# Patient Record
Sex: MALE | Race: WHITE | NOT HISPANIC OR LATINO | Employment: FULL TIME | URBAN - METROPOLITAN AREA
[De-identification: names, ages, dates, MRNs, and addresses within clinical notes are randomized per-mention and may not be internally consistent; named-entity substitution may affect disease eponyms.]

---

## 2017-02-13 ENCOUNTER — GENERIC CONVERSION - ENCOUNTER (OUTPATIENT)
Dept: OTHER | Facility: OTHER | Age: 52
End: 2017-02-13

## 2017-04-07 ENCOUNTER — GENERIC CONVERSION - ENCOUNTER (OUTPATIENT)
Dept: OTHER | Facility: OTHER | Age: 52
End: 2017-04-07

## 2017-05-18 ENCOUNTER — ALLSCRIPTS OFFICE VISIT (OUTPATIENT)
Dept: OTHER | Facility: OTHER | Age: 52
End: 2017-05-18

## 2018-01-12 VITALS
TEMPERATURE: 97.8 F | DIASTOLIC BLOOD PRESSURE: 58 MMHG | WEIGHT: 195 LBS | OXYGEN SATURATION: 95 % | SYSTOLIC BLOOD PRESSURE: 120 MMHG | RESPIRATION RATE: 16 BRPM | HEART RATE: 58 BPM

## 2018-09-17 ENCOUNTER — OFFICE VISIT (OUTPATIENT)
Dept: INTERNAL MEDICINE CLINIC | Facility: CLINIC | Age: 53
End: 2018-09-17
Payer: COMMERCIAL

## 2018-09-17 VITALS
HEART RATE: 69 BPM | DIASTOLIC BLOOD PRESSURE: 90 MMHG | HEIGHT: 69 IN | SYSTOLIC BLOOD PRESSURE: 136 MMHG | OXYGEN SATURATION: 99 % | WEIGHT: 199 LBS | BODY MASS INDEX: 29.47 KG/M2

## 2018-09-17 DIAGNOSIS — R63.5 WEIGHT GAIN: ICD-10-CM

## 2018-09-17 DIAGNOSIS — F32.9 MAJOR DEPRESSIVE DISORDER WITH SINGLE EPISODE, REMISSION STATUS UNSPECIFIED: ICD-10-CM

## 2018-09-17 DIAGNOSIS — Z13.29 SCREENING FOR THYROID DISORDER: ICD-10-CM

## 2018-09-17 DIAGNOSIS — K22.719 BARRETT'S ESOPHAGUS WITH DYSPLASIA: ICD-10-CM

## 2018-09-17 DIAGNOSIS — N52.9 ERECTILE DYSFUNCTION, UNSPECIFIED ERECTILE DYSFUNCTION TYPE: ICD-10-CM

## 2018-09-17 DIAGNOSIS — Z13.0 ENCOUNTER FOR SCREENING FOR DISEASES OF THE BLOOD AND BLOOD-FORMING ORGANS AND CERTAIN DISORDERS INVOLVING THE IMMUNE MECHANISM: ICD-10-CM

## 2018-09-17 DIAGNOSIS — Z23 NEED FOR SHINGLES VACCINE: ICD-10-CM

## 2018-09-17 DIAGNOSIS — Z13.1 SCREENING FOR DIABETES MELLITUS: ICD-10-CM

## 2018-09-17 DIAGNOSIS — Z12.5 SCREENING FOR PROSTATE CANCER: ICD-10-CM

## 2018-09-17 DIAGNOSIS — Z13.220 SCREENING FOR HYPERCHOLESTEROLEMIA: ICD-10-CM

## 2018-09-17 DIAGNOSIS — Z00.00 ENCOUNTER FOR WELLNESS EXAMINATION: Primary | ICD-10-CM

## 2018-09-17 DIAGNOSIS — Z11.59 NEED FOR HEPATITIS C SCREENING TEST: ICD-10-CM

## 2018-09-17 PROBLEM — F41.9 ANXIETY: Status: ACTIVE | Noted: 2018-09-17

## 2018-09-17 PROCEDURE — 99396 PREV VISIT EST AGE 40-64: CPT | Performed by: INTERNAL MEDICINE

## 2018-09-17 RX ORDER — SILDENAFIL 50 MG/1
50 TABLET, FILM COATED ORAL DAILY PRN
Qty: 10 TABLET | Refills: 3 | Status: SHIPPED | OUTPATIENT
Start: 2018-09-17 | End: 2019-08-09 | Stop reason: SDUPTHER

## 2018-09-17 RX ORDER — ESCITALOPRAM OXALATE 10 MG/1
TABLET ORAL
Refills: 3 | COMMUNITY
Start: 2018-08-23

## 2018-09-17 RX ORDER — PANTOPRAZOLE SODIUM 40 MG/1
TABLET, DELAYED RELEASE ORAL
COMMUNITY
Start: 2018-03-12 | End: 2021-01-22 | Stop reason: SDUPTHER

## 2018-09-17 NOTE — ASSESSMENT & PLAN NOTE
Last colonoscopy was 04/07/2017, and he is due for repeat 2022  Discussed preventative health, cancer screening, immunizations, and safety issues    Discussed that he should wear seatbelt, not get sunburned, etc    Patient is getting the flu shot at work, prescription for shingles shot given

## 2018-09-17 NOTE — PROGRESS NOTES
Assessment/Plan:    Hutton's esophagus with dysplasia   Follow-up GI for surveillance, and continue proton pump inhibitor as per GI    Major depressive disorder, single episode  Follow up psychiatry, and discussed the possibility of titrating off    Encounter for wellness examination   Last colonoscopy was 04/07/2017, and he is due for repeat 2022  Discussed preventative health, cancer screening, immunizations, and safety issues  Discussed that he should wear seatbelt, not get sunburned, etc    Patient is getting the flu shot at work, prescription for shingles shot given       Diagnoses and all orders for this visit:    Encounter for wellness examination    Hutton's esophagus with dysplasia    Major depressive disorder with single episode, remission status unspecified    Need for shingles vaccine  -     Zoster Vac Recomb Adjuvanted ([address]) 50 MCG SUSR; Inject 50 mcg into a muscle once for 1 dose Repeat one dose in 2 to 6 months    Screening for diabetes mellitus  -     Comprehensive metabolic panel; Future    Screening for thyroid disorder  -     TSH, 3rd generation with Free T4 reflex; Future    Screening for hypercholesterolemia  -     Lipid Panel with Direct LDL reflex; Future    Screening for prostate cancer  -     PSA, Total Screen; Future    Need for hepatitis C screening test    Encounter for screening for diseases of the blood and blood-forming organs and certain disorders involving the immune mechanism  -     CBC and differential; Future  -     Hepatitis C antibody; Future    Erectile dysfunction, unspecified erectile dysfunction type  -     Testosterone; Future  -     sildenafil (VIAGRA) 50 MG tablet; Take 1 tablet (50 mg total) by mouth daily as needed for erectile dysfunction    Weight gain  -     Ambulatory referral to Weight Management;  Future    Other orders  -     Cancel: influenza vaccine, [id number], quadrivalent, 0 5 mL, preservative-free (SYRINGE, SINGLE-DOSE VIAL), for adult and pediatric patients 3 yr+ (AFLURIA, FLUARIX, FLULAVAL, FLUZONE)  -     escitalopram (LEXAPRO) 10 mg tablet; 1 tablet daily  -     oxiconazole (OXISTAT) 1 % lotion; APPLY TO AFFECTED AREA TWICE A DAY AS NEEDED  -     pantoprazole (PROTONIX) 40 mg tablet; TAKE 1 TABLET BY MOUTH EVERY DAY          Subjective:      Patient ID: Dorothea Tao is a 46 y o  male  GERD:  Patient taking proton pump inhibitor daily, no significant GERD, no problems with food getting stuck  Patient does have Hutton's esophagus, and is on proton pump inhibitor for this and follows with GI  Anxiety:  Patient taking the SSRI daily, no significant problems with anxiety  Patient did have an episode of depression, and he has been seen Psychiatry about every 6 months     patient here for wellness exam, he sees a dentist regularly, exercises, eats healthfully, no smoking cigarettes  The following portions of the patient's history were reviewed and updated as appropriate: allergies, current medications, past family history, past medical history, past social history, past surgical history and problem list     Review of Systems   Constitutional: Negative for chills, fatigue and fever  HENT: Negative for congestion, nosebleeds, postnasal drip, sore throat and trouble swallowing  Eyes: Negative for pain  Respiratory: Negative for cough, chest tightness, shortness of breath and wheezing  Cardiovascular: Negative for chest pain, palpitations and leg swelling  Gastrointestinal: Negative for abdominal pain, constipation, diarrhea, nausea and vomiting  Endocrine: Negative for polydipsia and polyuria  Genitourinary: Negative for dysuria, flank pain and hematuria  Musculoskeletal: Negative for arthralgias  Skin: Negative for rash  Neurological: Negative for dizziness, tremors and headaches  Hematological: Does not bruise/bleed easily  Psychiatric/Behavioral: Negative for confusion and dysphoric mood   The patient is not nervous/anxious  Objective:      /90 (BP Location: Left arm, Patient Position: Sitting, Cuff Size: Large)   Pulse 69   Ht 5' 9" (1 753 m)   Wt 90 3 kg (199 lb)   SpO2 99%   BMI 29 39 kg/m²          Physical Exam   Constitutional: He is oriented to person, place, and time  He appears well-developed and well-nourished  No distress  HENT:   Head: Normocephalic and atraumatic  Right Ear: External ear normal    Left Ear: External ear normal    Eyes: Conjunctivae are normal  No scleral icterus  Neck: Normal range of motion  Neck supple  No tracheal deviation present  No thyromegaly present  Cardiovascular: Normal rate, regular rhythm and normal heart sounds  No murmur heard  Pulmonary/Chest: Effort normal and breath sounds normal  No respiratory distress  He has no wheezes  He has no rales  Abdominal: Soft  Bowel sounds are normal  There is no tenderness  There is no rebound and no guarding  Hernia confirmed negative in the right inguinal area and confirmed negative in the left inguinal area  Genitourinary: Rectum normal, prostate normal and penis normal  Right testis shows no mass and no tenderness  Left testis shows no mass and no tenderness  Musculoskeletal: He exhibits no edema  Lymphadenopathy:     He has no cervical adenopathy  Neurological: He is alert and oriented to person, place, and time  Psychiatric: He has a normal mood and affect  His behavior is normal  Judgment and thought content normal    Vitals reviewed

## 2019-08-09 DIAGNOSIS — N52.9 ERECTILE DYSFUNCTION, UNSPECIFIED ERECTILE DYSFUNCTION TYPE: ICD-10-CM

## 2019-08-09 RX ORDER — SILDENAFIL 50 MG/1
50 TABLET, FILM COATED ORAL DAILY PRN
Qty: 10 TABLET | Refills: 3 | Status: SHIPPED | OUTPATIENT
Start: 2019-08-09 | End: 2020-07-10 | Stop reason: SDUPTHER

## 2019-10-10 ENCOUNTER — APPOINTMENT (OUTPATIENT)
Dept: URGENT CARE | Facility: CLINIC | Age: 54
End: 2019-10-10

## 2019-10-10 ENCOUNTER — TRANSCRIBE ORDERS (OUTPATIENT)
Dept: URGENT CARE | Facility: CLINIC | Age: 54
End: 2019-10-10

## 2019-10-10 DIAGNOSIS — Z23 NEED FOR INFLUENZA VACCINATION: Primary | ICD-10-CM

## 2019-10-10 DIAGNOSIS — Z23 NEEDS FLU SHOT: Primary | ICD-10-CM

## 2019-10-31 ENCOUNTER — OFFICE VISIT (OUTPATIENT)
Dept: DERMATOLOGY | Facility: CLINIC | Age: 54
End: 2019-10-31
Payer: COMMERCIAL

## 2019-10-31 VITALS — HEIGHT: 61 IN | BODY MASS INDEX: 36.63 KG/M2 | WEIGHT: 194 LBS | TEMPERATURE: 98.7 F

## 2019-10-31 DIAGNOSIS — L57.8 ACTINIC SKIN DAMAGE: ICD-10-CM

## 2019-10-31 DIAGNOSIS — L82.1 SEBORRHEIC KERATOSIS: ICD-10-CM

## 2019-10-31 DIAGNOSIS — Z85.820 HISTORY OF MELANOMA: Primary | ICD-10-CM

## 2019-10-31 DIAGNOSIS — D22.9 MULTIPLE MELANOCYTIC NEVI: ICD-10-CM

## 2019-10-31 PROCEDURE — 99214 OFFICE O/P EST MOD 30 MIN: CPT | Performed by: DERMATOLOGY

## 2019-10-31 RX ORDER — HYDROQUINONE 40 MG/G
CREAM TOPICAL
Qty: 28.35 G | Refills: 0 | Status: SHIPPED | OUTPATIENT
Start: 2019-10-31 | End: 2020-02-10 | Stop reason: SDUPTHER

## 2019-10-31 NOTE — PROGRESS NOTES
[address] [facility] Note     Patient Name: Segundo Li  Encounter Date: 10/31/2019    Today's Chief Concerns:  [place]  Concern #1:  Full body Exam Hx of MM       Past Medical History:  Have you ever had or currently have any of the following medical conditions or treatments? · HIV/AIDS: No  · Hepatitis B: No  · Hepatitis C: No   · Diabetes: No  · Tuberculosis: No  · Biologic Therapy/Chemotherapy: No  · Organ or Bone Marrow Transplantation: No  · Radiation Treatment: No  · Cancer (If Yes, which types)- No      Have you ever had any of the following skin conditions? · Melanoma? (If Yes, please provide more detail)- YES, back  · Basal Cell Carcinoma: No  · Squamous Cell Carcinoma: No  · Sebaceous Cell Carcinoma: No  · Merkel Cell Carcinoma: No  · Angiosarcoma: No  · Blistering Sunburns: No  · Eczema: No  · Psoriasis: No    Social History:    What is your current Smoking Status? Never smoker     What is/was your primary occupation?      What are your hobbies/past-times? Golf    Family history:  Do any of your "first degree relatives" (parent, brother, sister, or child) have any of the following conditions? · Melanoma? (If Yes, which relatives?) YES, MGM  · Eczema: No  · Asthma: No  · Hay Fever/Seasonal Allergies: No  · Psoriasis: No  · Arthritis: No  · Thyroid Problems: No  · Lupus/Connective Tissue Disease: No  · Diabetes: No  · Stroke: No  · Blood Clots: No  · IBD/Crohn's/Ulcerative Colitis: No  · Vitiligo: No  · Scarring/Keloids: No  · Severe Acne: No  · Pancreatic Cancer: No  · Other known Skin Condition? If Yes, what condition and which relatives? No    Current Medications:    Current Outpatient Medications:     escitalopram (LEXAPRO) 10 mg tablet, 1 tablet daily  , Disp: , Rfl: 3    oxiconazole (OXISTAT) 1 % lotion, APPLY TO AFFECTED AREA TWICE A DAY AS NEEDED, Disp: , Rfl:     pantoprazole (PROTONIX) 40 mg tablet, TAKE 1 TABLET BY MOUTH EVERY DAY, Disp: , Rfl:     sildenafil (VIAGRA) 50 MG tablet, Take 1 tablet (50 mg total) by mouth daily as needed for erectile dysfunction, Disp: 10 tablet, Rfl: 3    Specific Alerts:    Have you been seen by a [facility]atologist in the last 3 years? YES    Are you pregnant or planning to become pregnant? N/A    Are you currently or planning to be nursing or breast feeding? N/A    Allergies   Allergen Reactions    Diflucan [Fluconazole] Other (See Comments)     Mouth sores       May we call your Preferred Phone number to discuss your specific medical information? YES    May we leave a detailed message that includes your specific medical information? YES    Have you traveled outside of the [facility] in the past 3 months? No    Do you currently have a pacemaker or defibrillator? No    Do you have any artificial heart valves, joints, plates, screws, rods, stents, pins, etc?No   - If Yes, were any placed within the last 2 years? Do you require any medications prior to a surgical procedure? No   - If Yes, for which procedure? n/a   - If Yes, what medications to you require? n/a    Are you taking any medications that cause you to bleed more easily ("blood thinners") No    Have you ever experienced a rapid heartbeat with epinephrine? No    Have you ever been treated with "gold" (gold sodium thiomalate) therapy? No    [facility] can help with wrinkles, "laugh lines," facial volume loss, "double chin," "love handles," age spots, and more  Are you interested in learning today about some of the skin enhancement procedures that we offer? (If Yes, please provide more detail) No    Review of Systems:  Have you recently had or currently have any of the following?     · Fever or chills: No  · Night Sweats: No  · Headaches: No  · Weight Gain: No  · Weight Loss: No  · Blurry Vision: No  · Nausea: No  · Vomiting: No  · Diarrhea: No  · Blood in Stool: No  · Abdominal Pain: No  · Itchy Skin: No  · Painful Joints: No  · Swollen Joints: No  · Muscle Pain: No  · Irregular Mole: No  · Sun Burn: No  · Dry Skin: No  · Skin Color Changes: No  · Scar or Keloid: No  · Cold Sores/Fever Blisters: No  · Bacterial Infections/MRSA: No  · Anxiety: No  · Depression: No  · Suicidal or Homicidal Thoughts: No      PHYSICAL EXAM:      Was a chaperone (Derm Clinical Assistant) present for the entirety of the Physical Exam? YES    Did the Dermatology Team specifically ask and  the patient on the importance of a Full Skin Exam to be sure that nothing is missed clinically? YES    Did the patient request or accept a Full Skin Exam?  YES    Did the patient specifically refuse to have the areas "under-the-bra" examined by the Dermatologist? No    Did the patient specifically refuse to have the areas "under-the-underwear" examined by the Dermatologist? No      CONSTITUTIONAL:   There were no vitals filed for this visit      PSYCH: Normal mood and affect  EYES: Normal conjunctiva  ENT: Normal lips and oral mucosa  CARDIOVASCULAR: No edema  RESPIRATORY: Normal respirations  HEME/LYMPH/IMMUNO:  No regional lymphadenopathy except as noted below in ASSESSMENT AND PLAN BY DIAGNOSIS    FULL ORGAN SYSTEM SKIN EXAM (SKIN)  Hair, Scalp, Ears, Face Normal except as noted below in Assessment   Neck, Cervical Chain Nodes Normal except as noted below in Assessment   Right Arm/Hand/Fingers Normal except as noted below in Assessment   Left Arm/Hand/Fingers Normal except as noted below in Assessment   Chest/Breasts/Axillae Viewed areas Normal except as noted below in Assessment   Abdomen, Umbilicus Normal except as noted below in Assessment   Back/Spine Normal except as noted below in Assessment   Groin/Genitalia/Buttocks Viewed areas Normal except as noted below in Assessment   Right Leg, Foot, Toes Normal except as noted below in Assessment   Left Leg, Foot, Toes Normal except as noted below in Assessment        ASSESSMENT AND PLAN BY DIAGNOSIS:    History of Present Condition:     Duration:  How long has this been an issue for you?    o  Years    Location Affected:  Where on the body is this affecting you?    o  Trunk   Quality:  Is there any bleeding, pain, itch, burning/irritation, or redness associated with the skin lesion?    o  Denies   Severity:  Describe any bleeding, pain, itch, burning/irritation, or redness on a scale of 1 to 10 (with 10 being the worst)  o  Denies   Timing:  Does this condition seem to be there pretty constantly or do you notice it more at specific times throughout the day?    o  Denies   Context:  Have you ever noticed that this condition seems to be associated with specific activities you do?    o  Denies   Modifying Factors:    o Anything that seems to make the condition worse?    -  Denies  o What have you tried to do to make the condition better? -  Denies   Associated Signs and Symptoms:  Does this skin lesion seem to be associated with any of the following:  o Denies    1  HISTORY OF MELANOMA/ MIS    Physical Exam:   Anatomic Location Affected:  Back and left leg   Morphological Description of Scar:  Well healed surgical scar   Year Treated: 10 years ago   TNM Classification: Unknown    Suspected Recurrence: no   Regional adenopathy: no    Assessment and Plan:  Based on a thorough discussion of this condition and the management approach to it (including a comprehensive discussion of the known risks, side effects and potential benefits of treatment), the patient (family) agrees to implement the following specific plan:   Start Neutrogena Daily Defense SPF 50 + at least 3 times a day   Yearly Exam     What happens at follow-up? The main purpose of follow-up is to detect recurrences early (metastatic melanoma), but it also offers an opportunity to diagnose a new primary melanoma at the first possible opportunity   A second invasive melanoma occurs in 5-10% of melanoma patients and a new melanoma in situ is diagnosed in more than 20% of melanoma patients  Our practice makes the following recommendations for follow-up for patients with invasive melanoma   At-least "monthly" self-skin examinations    Routine skin checks by a board certified dermatologist   Follow-up intervals are "every 3 months" within 2 years of a new melanoma diagnosis; "every 6 months" between 2-4 years of a new melanoma diagnosis; and "annually" after 4 years of a new melanoma diagnosis   Individual patient's needs should be considered before an appropriate follow-up is offered   Provide education and support to help the patient adjust to their illness    Follow-up appointments should include:   A check of the scar where the primary melanoma was removed   Checking the regional lymph nodes   A general skin examination   A full physical examination at least annually by your primary care physician    In those with more advanced primary disease, follow-up may include:   Blood tests   Imaging: ultrasound, X-ray, CT, MRI and PET scan  Most tests are not worthwhile for patients with stage 1 or 2 melanoma unless there are signs or symptoms of disease recurrence or metastasis  No tests are necessary for healthy patients who have remained well for five years or longer after removal of their melanoma  What is the outlook for patients with melanoma?  Melanoma in situ is cured by excision because it has no potential to spread around the body   The risk of spread and ultimate death from invasive melanoma depends on several factors, but the main one is the Breslow thickness of the melanoma at the time it was surgically removed   Metastases are rare for melanomas < 0 75 mm and the risk for tumours 0 75-1 mm thick is about 5%  The risk steadily increases with thickness so that melanomas > 4 mm have a risk of metastasis of about 40%      Melanoma is a potentially serious type of skin cancer, in which there is uncontrolled growth of melanocytes (pigment cells)  Melanoma is sometimes called malignant melanoma  Normal melanocytes are found in the basal layer of the epidermis (the outer layer of skin)  Melanocytes produce a protein called melanin, which protects skin cells by absorbing ultraviolet (UV) radiation  Melanocytes are found in equal numbers in black and white skin, but melanocytes in black skin produce much more melanin  People with dark brown or black skin are very much less likely to be damaged by UV radiation than those with white skin  2  ACTINIC DAMAGE (Chronic Ultraviolet Radiation Exposure)    Physical Exam:   Anatomic Location Affected:  Trunk neck and extremities    Morphological Description:  Mottled (hyper- and hypo-pigmented), slightly atrophic skin with overlying telangiectasia  Poiklodermattous changes are prominent on neck bilaterally with hyperpigmentation   Pertinent Positives:   Pertinent Negatives: No lymphadenopathy    Assessment and Plan:  Based on a thorough discussion of this condition and the management approach to it (including a comprehensive discussion of the known risks, side effects and potential benefits of treatment), the patient (family) agrees to implement the following specific plan:   Yearly Exam     Hydorquinone 4% BID neck for 4 months only  Reviewed adverse effect and pardoxical pigmentation  Sunscreen use recommended  Photo-aging and actinic damage of skin is common on sites repeatedly exposed to the sun, especially the backs of the hands and the face, most often affecting the ears, nose, cheeks, upper lip, vermilion of the lower lip, temples, forehead and balding scalp  In severely chronically sun-exposed individuals, this condition may also be found on the upper trunk, upper and lower limbs, and dorsum of feet  Photo-aging induces cutaneous changes that vary among individuals, reflecting inherent differences in vulnerability to sun exposure and repair capacity      We discussed further steps to minimize or avoid UV exposure:     Be aware of daily UV index levels  In the [place] [facility], this index is often reported on the [address]   Avoid outdoor activities during the middle of the day   Wear sun-protective clothing (e g , UPF-rated, broad-brimmed hats, long sleeves, and trousers or skirts)   Apply a high sun protection factor (60+) broad-spectrum sunscreen moisturizer at least three times a day to affected areas, year-round  I recommended Neutrogena Daily Defense or CeraVe AM or Aveeno   Do not smoke, and where possible, avoid exposure to pollutants   Get plenty of exercise -- active people appear younger than inactive people   Eat fruit and vegetables daily   Many oral supplements with antioxidant and anti-inflammatory properties have been advocated to mitigate skin aging and to improve skin health  These include carotenoids; polyphenols; chlorophyll; aloe vera; vitamins B, C, and E; red ginseng; squalene; and omega-3 fatty acids  Their role in combatting skin aging is unclear  3  SEBORRHEIC KERATOSIS; NON-INFLAMED    Physical Exam:   Anatomic Location Affected:  Trunk, including left back   Morphological Description:  Flat and raised, waxy, smooth to warty textured, yellow to brownish-grey to dark brown to blackish, discrete, "stuck-on" appearing papules   Pertinent Positives:   Pertinent Negatives: Additional History of Present Condition:  Patient reports new bumps on the skin  Denies itch, burn, pain, bleeding or ulceration  Present constantly; nothing seems to make it worse or better  No prior treatment        Assessment and Plan:  Based on a thorough discussion of this condition and the management approach to it (including a comprehensive discussion of the known risks, side effects and potential benefits of treatment), the patient (family) agrees to implement the following specific plan:   Benign, no treatment attempted     Seborrheic Keratosis  A seborrheic keratosis is a harmless warty spot that appears during adult life as a common sign of skin aging  Seborrheic keratoses can arise on any area of skin, covered or uncovered, with the usual exception of the palms and soles  They do not arise from mucous membranes  Seborrheic keratoses can have highly variable appearance  Seborrheic keratoses are extremely common  It has been estimated that over 90% of adults over the age of 61 years have one or more of them  They occur in males and females of all races, typically beginning to erupt in the 35s or 45s  They are uncommon under the age of 21 years  The precise cause of seborrhoeic keratoses is not known  Seborrhoeic keratoses are considered degenerative in nature  As time goes by, seborrheic keratoses tend to become more numerous  Some people inherit a tendency to develop a very large number of them; some people may have hundreds of them  The name "seborrheic keratosis" is misleading, because these lesions are not limited to a seborrhoeic distribution (scalp, mid-face, chest, upper back), nor are they formed from sebaceous glands, nor are they associated with sebum -- which is greasy  Seborrheic keratosis may also be called "SK," "Seb K," "basal cell papilloma," "senile wart," or "barnacle "      Researchers have noted:   Eruptive seborrhoeic keratoses can follow sunburn or dermatitis   Skin friction may be the reason they appear in body folds   Viral cause (e g , human papillomavirus) seems unlikely   Stable and clonal mutations or activation of FRFR3, PIK3CA, WILL, AKT1 and EGFR genes are found in seborrhoeic keratoses   Seborrhoeic keratosis can arise from solar lentigo   FRFR3 mutations also arise in solar lentigines   These mutations are associated with increased age and location on the head and neck, suggesting a role of ultraviolet radiation in these lesions   Seborrheic keratoses do not harbour tumour suppressor gene mutations   Epidermal growth factor receptor inhibitors, which are used to treat some cancers, often result in an increase in verrucal (warty) keratoses  There is no easy way to remove multiple lesions on a single occasion  Unless a specific lesion is "inflamed" and is causing pain or stinging/burning or is bleeding, most insurance companies do not authorize treatment  4  MELANOCYTIC NEVI ("Moles")    Physical Exam:   Anatomic Location Affected:   Mostly on sun-exposed areas of the trunk   Morphological Description:  Scattered, 1-4mm round to ovoid, symmetrical-appearing, even bordered, skin colored to dark brown macules/papules, mostly in sun-exposed areas    Assessment and Plan:  Based on a thorough discussion of this condition and the management approach to it (including a comprehensive discussion of the known risks, side effects and potential benefits of treatment), the patient (family) agrees to implement the following specific plan:   Yearly Exam      Melanocytic Nevi  Melanocytic nevi ("moles") are tan or brown, raised or flat areas of the skin which have an increased number of melanocytes  Melanocytes are the cells in our body which make pigment and account for skin color  Some moles are present at birth (I e , "congenital nevi"), while others come up later in life (i e , "acquired nevi")  The sun can stimulate the body to make more moles  Sunburns are not the only thing that triggers more moles  Chronic sun exposure can do it too  Clinically distinguishing a healthy mole from melanoma may be difficult, even for experienced dermatologists  The "ABCDE's" of moles have been suggested as a means of helping to alert a person to a suspicious mole and the possible increased risk of melanoma  The suggestions for raising alert are as follows:    Asymmetry: Healthy moles tend to be symmetric, while melanomas are often asymmetric    Asymmetry means if you draw a line through the mole, the two halves do not match in color, size, shape, or surface texture  Asymmetry can be a result of rapid enlargement of a mole, the development of a raised area on a previously flat lesion, scaling, ulceration, bleeding or scabbing within the mole  Any mole that starts to demonstrate "asymmetry" should be examined promptly by a board certified dermatologist      Border: Healthy moles tend to have discrete, even borders  The border of a melanoma often blends into the normal skin and does not sharply delineate the mole from normal skin  Any mole that starts to demonstrate "uneven borders" should be examined promptly by a board certified dermatologist      Color: Healthy moles tend to be one color throughout  Melanomas tend to be made up of different colors ranging from dark black, blue, white, or red  Any mole that demonstrates a color change should be examined promptly by a board certified dermatologist      Diameter: Healthy moles tend to be smaller than 0 6 cm in size; an exception are "congenital nevi" that can be larger  Melanomas tend to grow and can often be greater than 0 6 cm (1/4 of an inch, or the size of a pencil eraser)  This is only a guideline, and many normal moles may be larger than 0 6 cm without being unhealthy  Any mole that starts to change in size (small to bigger or bigger to smaller) should be examined promptly by a board certified dermatologist      Evolving: Healthy moles tend to "stay the same "  Melanomas may often show signs of change or evolution such as a change in size, shape, color, or elevation  Any mole that starts to itch, bleed, crust, burn, hurt, or ulcerate or demonstrate a change or evolution should be examined promptly by a board certified dermatologist       Dysplastic Nevi  Dysplastic moles are moles that fit the ABCDE rules of melanoma but are not identified as melanomas when examined under the microscope  They may indicate an increased risk of melanoma in that person   If there is a family history of melanoma, most experts agree that the person may be at an increased risk for developing a melanoma  Experts still do not agree on what dysplastic moles mean in patients without a personal or family history of melanoma  Dysplastic moles are usually larger than common moles and have different colors within it with irregular borders  The appearance can be very similar to a melanoma  Biopsies of dysplastic moles may show abnormalities which are different from a regular mole  Melanoma  Malignant melanoma is a type of skin cancer that can be deadly if it spreads throughout the body  The incidence of melanoma in the [place] is growing faster than any other cancer  Melanoma usually grows near the surface of the skin for a period of time, and then begins to grow deeper into the skin  Once it grows deeper into the skin, the risk of spread to other organs greatly increases  Therefore, early detection and removal of a malignant melanoma may result in a better chance at a complete cure; removal after the tumor has spread may not be as effective, leading to worse clinical outcomes such as death  The true rate of nevus transformation into a melanoma is unknown  It has been estimated that the lifetime risk for any acquired melanocytic nevus on any 21year-old individual transforming into melanoma by age [de-identified] is 0 03% (1 in 3,164) for men and 0 009% (1 in 10,800) for women  The appearance of a "new mole" remains one of the most reliable methods for identifying a malignant melanoma  Occasionally, melanomas appear as rapidly growing, blue-black, dome-shaped bumps within a previous mole or previous area of normal skin  Other times, melanomas are suspected when a mole suddenly appears or changes  Itching, burning, or pain in a pigmented lesion should increase suspicion, but most patients with early melanoma have no skin discomfort whatsoever    Melanoma can occur anywhere on the skin, including areas that are difficult for self-examination  Many melanomas are first noticed by other family members  Suspicious-looking moles may be removed for microscopic examination  You may be able to prevent death from melanoma by doing two simple things:    1  Try to avoid unnecessary sun exposure and protect your skin when it is exposed to the sun  People who live near the equator, people who have intermittent exposures to large amounts of sun, and people who have had sunburns in childhood or adolescence have an increased risk for melanoma  Sun sense and vigilant sun protection may be keys to helping to prevent melanoma  We recommend wearing UPF-rated sun protective clothing and sunglasses whenever possible and applying a moisturizer-sunscreen combination product (SPF 50+) such as Neutrogena Daily Defense to sun exposed areas of skin at least three times a day  2  Have your moles regularly examined by a board certified dermatologist AND by yourself or a family member/friend at home  We recommend that you have your moles examined at least once a year by a board certified dermatologist   Use your birthday as an annual reminder to have your "Birthday Suit" (I e , your skin) examined; it is a nice birthday gift to yourself to know that your skin is healthy appearing! Additionally, at-home self examinations may be helpful for detecting a possible melanoma  Use the ABCDEs we discussed and check your moles once a month at home            Scribe Attestation    I,:   Denver Bucker, MA am acting as a scribe while in the presence of the attending physician :        I,:   Cassi Horne MD personally performed the services described in this documentation    as scribed in my presence :

## 2020-02-10 DIAGNOSIS — B36.0 TINEA VERSICOLOR: Primary | ICD-10-CM

## 2020-02-10 DIAGNOSIS — L57.8 ACTINIC SKIN DAMAGE: ICD-10-CM

## 2020-02-10 RX ORDER — HYDROQUINONE 40 MG/G
CREAM TOPICAL
Qty: 28.35 G | Refills: 0 | Status: SHIPPED | OUTPATIENT
Start: 2020-02-10 | End: 2020-04-10

## 2020-04-10 DIAGNOSIS — L57.8 ACTINIC SKIN DAMAGE: ICD-10-CM

## 2020-04-10 RX ORDER — HYDROQUINONE 40 MG/G
CREAM TOPICAL
Qty: 28.35 G | Refills: 0 | Status: SHIPPED | OUTPATIENT
Start: 2020-04-10

## 2020-07-08 ENCOUNTER — TELEMEDICINE (OUTPATIENT)
Dept: INTERNAL MEDICINE CLINIC | Facility: CLINIC | Age: 55
End: 2020-07-08
Payer: COMMERCIAL

## 2020-07-08 DIAGNOSIS — Z13.220 SCREENING FOR HYPERCHOLESTEROLEMIA: ICD-10-CM

## 2020-07-08 DIAGNOSIS — Z13.1 SCREENING FOR DIABETES MELLITUS: ICD-10-CM

## 2020-07-08 DIAGNOSIS — Z20.822 EXPOSURE TO 2019 NOVEL CORONAVIRUS: Primary | ICD-10-CM

## 2020-07-08 DIAGNOSIS — Z12.5 SCREENING FOR PROSTATE CANCER: ICD-10-CM

## 2020-07-08 DIAGNOSIS — Z13.29 SCREENING FOR THYROID DISORDER: ICD-10-CM

## 2020-07-08 DIAGNOSIS — E55.9 VITAMIN D DEFICIENCY: ICD-10-CM

## 2020-07-08 DIAGNOSIS — Z13.0 ENCOUNTER FOR SCREENING FOR DISEASES OF THE BLOOD AND BLOOD-FORMING ORGANS AND CERTAIN DISORDERS INVOLVING THE IMMUNE MECHANISM: ICD-10-CM

## 2020-07-08 DIAGNOSIS — Z11.59 NEED FOR HEPATITIS C SCREENING TEST: ICD-10-CM

## 2020-07-08 PROCEDURE — 99213 OFFICE O/P EST LOW 20 MIN: CPT | Performed by: INTERNAL MEDICINE

## 2020-07-08 PROCEDURE — 1036F TOBACCO NON-USER: CPT | Performed by: INTERNAL MEDICINE

## 2020-07-08 NOTE — ASSESSMENT & PLAN NOTE
If you choose to have a COVID-19 antibody test, you should understand some important limitations of this test   Antibody tests detect the body's immune response to infection rather than detecting the virus itself  It can take weeks for antibodies to show up in the blood  For this reason, antibody tests are not used to detect or manage infection  They may be better for tracking infections in the community  Current antibody tests have not undergone the usual strict FDA approval process  The FDA decided to make it easier to have more tests available  The result is that these new tests may not be reliable  Since COVID-19 antibody testing is so new, we do not know how accurate it is  You can have a positive test and have not actually been infected  You also can have a negative test even though you might have been infected  There are other coronaviruses that are known to cause the common cold  Many people may have antibodies to these other viruses that could make the COVID-19 antibody test positive  More importantly, even if you test positive, this does not necessarily mean you are immune to the virus  Even so, your [facility]'s provider understands that you may still want a COVID-19 antibody test and can order this test for you  It is important that you review the results with your provider and decide together how to use them

## 2020-07-08 NOTE — PROGRESS NOTES
Virtual Regular Visit      Assessment/Plan:    Problem List Items Addressed This Visit        Other    Exposure to 2019 novel coronavirus - Primary     If you choose to have a COVID-19 antibody test, you should understand some important limitations of this test   Antibody tests detect the body's immune response to infection rather than detecting the virus itself  It can take weeks for antibodies to show up in the blood  For this reason, antibody tests are not used to detect or manage infection  They may be better for tracking infections in the community  Current antibody tests have not undergone the usual strict FDA approval process  The FDA decided to make it easier to have more tests available  The result is that these new tests may not be reliable  Since COVID-19 antibody testing is so new, we do not know how accurate it is  You can have a positive test and have not actually been infected  You also can have a negative test even though you might have been infected  There are other coronaviruses that are known to cause the common cold  Many people may have antibodies to these other viruses that could make the COVID-19 antibody test positive  More importantly, even if you test positive, this does not necessarily mean you are immune to the virus  Even so, your [facility]'s provider understands that you may still want a COVID-19 antibody test and can order this test for you  It is important that you review the results with your provider and decide together how to use them              Relevant Orders    SARS-CoV2 Antibody, Total (IgG, IgA, IgM) [facility]- Lab Collect      Other Visit Diagnoses     Encounter for screening for diseases of the blood and blood-forming organs and certain disorders involving the immune mechanism        Relevant Orders    CBC and differential    Screening for diabetes mellitus        Relevant Orders    Comprehensive metabolic panel    Screening for hypercholesterolemia        Relevant Orders    Lipid Panel with Direct LDL reflex    Screening for thyroid disorder        Relevant Orders    TSH, 3rd generation with Free T4 reflex    Screening for prostate cancer        Relevant Orders    PSA, Total Screen    Vitamin D deficiency        Relevant Orders    Vitamin D 25 hydroxy    Need for hepatitis C screening test        Relevant Orders    Hepatitis C antibody               Reason for visit is   Chief Complaint   Patient presents with    Virtual Regular Visit        Encounter provider Tk Evans MD    Provider located at [address] [place] [id number]      Recent Visits  No visits were found meeting these conditions  Showing recent visits within past 7 days and meeting all other requirements     Today's Visits  Date Type Provider Dept   07/08/20 Telemedicine Tl Ruth today's visits and meeting all other requirements     Future Appointments  No visits were found meeting these conditions  Showing future appointments within next 150 days and meeting all other requirements        The patient was identified by name and date of birth  Esther Pimentel was informed that this is a telemedicine visit and that the visit is being conducted through [facility] and patient was informed that this is a secure, HIPAA-compliant platform  He agrees to proceed     My office door was closed  No one else was in the room  He acknowledged consent and understanding of privacy and security of the video platform  The patient has agreed to participate and understands they can discontinue the visit at any time  Patient is aware this is a billable service  Subjective  Esther Pimentel is a 47 y o  male   Pt reports having COVID symptoms around February and want testing done  No current symptoms, no fevers, no chills, no anosmia, no cough, no shortness of breath         Past Medical History:   Diagnosis Date    Melanoma ([facility] [address] ) 10 years ago    Back    Melanoma ([facility] [address] ) 10 years ago    Neck       Past Surgical History:   Procedure Laterality Date    BACK SURGERY      TONSILLECTOMY      UVULECTOMY         Current Outpatient Medications   Medication Sig Dispense Refill    escitalopram (LEXAPRO) 10 mg tablet 1 tablet daily  3    hydroquinone 4 % cream APPLY TOPICALLY TO NECK TWICE A DAY 28 35 g 0    oxiconazole (OXISTAT) 1 % lotion To affected area  Twice daily 60 mL 3    pantoprazole (PROTONIX) 40 mg tablet TAKE 1 TABLET BY MOUTH EVERY DAY      sildenafil (VIAGRA) 50 MG tablet Take 1 tablet (50 mg total) by mouth daily as needed for erectile dysfunction (Patient not taking: Reported on 10/31/2019) 10 tablet 3     No current facility-administered medications for this visit  Allergies   Allergen Reactions    Diflucan [Fluconazole] Other (See Comments)     Mouth sores       Review of Systems   Constitutional: Negative for chills, fatigue and fever  HENT: Negative for congestion, nosebleeds, postnasal drip, sore throat and trouble swallowing  Eyes: Negative for pain  Respiratory: Negative for cough, chest tightness, shortness of breath and wheezing  Cardiovascular: Negative for chest pain, palpitations and leg swelling  Gastrointestinal: Negative for abdominal pain, constipation, diarrhea, nausea and vomiting  Endocrine: Negative for polydipsia and polyuria  Genitourinary: Negative for dysuria, flank pain and hematuria  Musculoskeletal: Negative for arthralgias  Skin: Negative for rash  Neurological: Negative for dizziness, tremors and headaches  Hematological: Does not bruise/bleed easily  Psychiatric/Behavioral: Negative for confusion and dysphoric mood  The patient is not nervous/anxious  Video Exam    There were no vitals filed for this visit      Physical Exam     I spent 20 minutes with patient today in which greater than 50% of the time was spent in counseling/coordination of care regarding acute concerns      VIRTUAL VISIT DISCLAIMER    Wing Stover acknowledges that he has consented to an online visit or consultation  He understands that the online visit is based solely on information provided by him, and that, in the absence of a face-to-face physical evaluation by the physician, the diagnosis he receives is both limited and provisional in terms of accuracy and completeness  This is not intended to replace a full medical face-to-face evaluation by the physician  [employer] understands and accepts these terms

## 2020-07-08 NOTE — PATIENT INSTRUCTIONS
Problem List Items Addressed This Visit        Other    Exposure to 2019 novel coronavirus - Primary     If you choose to have a COVID-19 antibody test, you should understand some important limitations of this test   Antibody tests detect the body's immune response to infection rather than detecting the virus itself  It can take weeks for antibodies to show up in the blood  For this reason, antibody tests are not used to detect or manage infection  They may be better for tracking infections in the community  Current antibody tests have not undergone the usual strict FDA approval process  The FDA decided to make it easier to have more tests available  The result is that these new tests may not be reliable  Since COVID-19 antibody testing is so new, we do not know how accurate it is  You can have a positive test and have not actually been infected  You also can have a negative test even though you might have been infected  There are other coronaviruses that are known to cause the common cold  Many people may have antibodies to these other viruses that could make the COVID-19 antibody test positive  More importantly, even if you test positive, this does not necessarily mean you are immune to the virus  Even so, your [facility]'s provider understands that you may still want a COVID-19 antibody test and can order this test for you  It is important that you review the results with your provider and decide together how to use them              Relevant Orders    SARS-CoV2 Antibody, Total (IgG, IgA, IgM) [facility]- Lab Collect      Other Visit Diagnoses     Encounter for screening for diseases of the blood and blood-forming organs and certain disorders involving the immune mechanism        Relevant Orders    CBC and differential    Screening for diabetes mellitus        Relevant Orders    Comprehensive metabolic panel    Screening for hypercholesterolemia        Relevant Orders    Lipid Panel with Direct LDL reflex Screening for thyroid disorder        Relevant Orders    TSH, 3rd generation with Free T4 reflex    Screening for prostate cancer        Relevant Orders    PSA, Total Screen    Vitamin D deficiency        Relevant Orders    Vitamin D 25 hydroxy    Need for hepatitis C screening test        Relevant Orders    Hepatitis C antibody        I recommend getting the Shingrix shot to help prevent Shingles  You can get it a pharmacy, and they can administer it there  It is a two shot series with the second shot needed between 2-6 months after the first shot  I would not recommend getting the shot before an important or fun event in case you were to have a reaction to the shot like a sore arm or flu-like symptoms  I make the same recommendation about any shot, as people can have a reaction to any shot      I also recommend Tdap vaccination

## 2020-07-10 DIAGNOSIS — N52.9 ERECTILE DYSFUNCTION, UNSPECIFIED ERECTILE DYSFUNCTION TYPE: ICD-10-CM

## 2020-07-10 RX ORDER — SILDENAFIL 50 MG/1
50 TABLET, FILM COATED ORAL DAILY PRN
Qty: 10 TABLET | Refills: 3 | Status: SHIPPED | OUTPATIENT
Start: 2020-07-10 | End: 2022-05-03 | Stop reason: SDUPTHER

## 2020-07-13 ENCOUNTER — TELEPHONE (OUTPATIENT)
Dept: INTERNAL MEDICINE CLINIC | Facility: CLINIC | Age: 55
End: 2020-07-13

## 2020-07-13 DIAGNOSIS — Z20.828 EXPOSURE TO SARS-ASSOCIATED CORONAVIRUS: Primary | ICD-10-CM

## 2020-07-13 NOTE — TELEPHONE ENCOUNTER
The patient's son was exposed to someone who has covid  The son saw his friend on Tuesday  The friend had a covid test done on thursday with came back positive  Both the friend and the patient's son are asymptomatic  The son and the patient live together  The patient would like to know if he should be tested  The patient's son has a covid visit tomorrow  Please advise  Thank you

## 2020-07-14 DIAGNOSIS — Z20.828 EXPOSURE TO SARS-ASSOCIATED CORONAVIRUS: ICD-10-CM

## 2020-07-14 PROCEDURE — U0003 INFECTIOUS AGENT DETECTION BY NUCLEIC ACID (DNA OR RNA); SEVERE ACUTE RESPIRATORY SYNDROME CORONAVIRUS 2 (SARS-COV-2) (CORONAVIRUS DISEASE [COVID-19]), AMPLIFIED PROBE TECHNIQUE, MAKING USE OF HIGH THROUGHPUT TECHNOLOGIES AS DESCRIBED BY CMS-2020-01-R: HCPCS | Performed by: OBSTETRICS & GYNECOLOGY

## 2020-07-15 LAB
INPATIENT: NORMAL
SARS-COV-2 RNA SPEC QL NAA+PROBE: NOT DETECTED

## 2020-08-14 ENCOUNTER — APPOINTMENT (OUTPATIENT)
Dept: LAB | Facility: MEDICAL CENTER | Age: 55
End: 2020-08-14
Payer: COMMERCIAL

## 2020-08-14 DIAGNOSIS — Z20.822 EXPOSURE TO 2019 NOVEL CORONAVIRUS: ICD-10-CM

## 2020-08-14 LAB — SARS-COV-2 IGG+IGM SERPL QL IA: NORMAL

## 2020-08-14 PROCEDURE — 86769 SARS-COV-2 COVID-19 ANTIBODY: CPT

## 2020-08-14 PROCEDURE — 36415 COLL VENOUS BLD VENIPUNCTURE: CPT

## 2020-10-22 ENCOUNTER — APPOINTMENT (OUTPATIENT)
Dept: URGENT CARE | Facility: CLINIC | Age: 55
End: 2020-10-22

## 2020-10-22 DIAGNOSIS — Z23 NEED FOR IMMUNIZATION AGAINST INFLUENZA: Primary | ICD-10-CM

## 2021-01-14 ENCOUNTER — TELEPHONE (OUTPATIENT)
Dept: GASTROENTEROLOGY | Facility: CLINIC | Age: 56
End: 2021-01-14

## 2021-01-14 DIAGNOSIS — K21.9 GASTROESOPHAGEAL REFLUX DISEASE, UNSPECIFIED WHETHER ESOPHAGITIS PRESENT: Primary | ICD-10-CM

## 2021-01-14 NOTE — TELEPHONE ENCOUNTER
Pt called for Pantoprazole refill  Hasn't been seen in office since 2015  Last Rx given for 30 day supply on 11/30/20  Left message on patient's phone that patient needs to schedule apt for further refills

## 2021-01-22 RX ORDER — PANTOPRAZOLE SODIUM 40 MG/1
40 TABLET, DELAYED RELEASE ORAL DAILY
Qty: 90 TABLET | Refills: 0 | Status: SHIPPED | OUTPATIENT
Start: 2021-01-22 | End: 2021-02-23 | Stop reason: SDUPTHER

## 2021-01-22 NOTE — TELEPHONE ENCOUNTER
Pt called this morning  He said he script was never called in to his pharmacy  He has a video minerva scheduled with Dr Carreon Fail   Can you call that in?

## 2021-02-08 ENCOUNTER — TELEPHONE (OUTPATIENT)
Dept: GASTROENTEROLOGY | Facility: CLINIC | Age: 56
End: 2021-02-08

## 2021-02-08 NOTE — TELEPHONE ENCOUNTER
I called the patient to let him know that Dr Funmilayo Curry stated that he would call him after his other visits, patient stated that he was on for over 45 mins waiting and did not want to go back on was leaving work now and will call tomorrow to reschedule his appt   Thanks [facility]

## 2021-02-23 ENCOUNTER — TELEMEDICINE (OUTPATIENT)
Dept: GASTROENTEROLOGY | Facility: CLINIC | Age: 56
End: 2021-02-23
Payer: COMMERCIAL

## 2021-02-23 DIAGNOSIS — B36.0 TINEA VERSICOLOR: ICD-10-CM

## 2021-02-23 DIAGNOSIS — K21.00 GASTROESOPHAGEAL REFLUX DISEASE WITH ESOPHAGITIS WITHOUT HEMORRHAGE: Primary | ICD-10-CM

## 2021-02-23 DIAGNOSIS — K21.9 GASTROESOPHAGEAL REFLUX DISEASE, UNSPECIFIED WHETHER ESOPHAGITIS PRESENT: ICD-10-CM

## 2021-02-23 DIAGNOSIS — R14.0 BLOATING: ICD-10-CM

## 2021-02-23 PROCEDURE — 99213 OFFICE O/P EST LOW 20 MIN: CPT | Performed by: INTERNAL MEDICINE

## 2021-02-23 RX ORDER — PANTOPRAZOLE SODIUM 40 MG/1
40 TABLET, DELAYED RELEASE ORAL DAILY
Qty: 90 TABLET | Refills: 0 | Status: SHIPPED | OUTPATIENT
Start: 2021-02-23 | End: 2021-07-01

## 2021-02-23 RX ORDER — SUCRALFATE 1 G/1
1 TABLET ORAL 4 TIMES DAILY
Qty: 120 TABLET | Refills: 1 | Status: SHIPPED | OUTPATIENT
Start: 2021-02-23

## 2021-02-23 NOTE — PROGRESS NOTES
Virtual Regular Visit      Assessment/Plan:     patient has had longstanding history of gastroesophageal reflux disease  Recently he is feeling more discomfort with epigastric soreness and borborygmi  He is in the significant amount of stress  His mother is not doing well  Mother has been diagnosed to have A LS  Patient denies any weight loss  He actually has gained significant amount of weight  He denies any chest pain, cough or wheezing  He denies any dysphagia or odynophagia  There is no rectal bleeding or mucus per rectum  He does not have any fevers or chills  There is no chest pain, cough or wheezing  There is no palpitation, orthopnea  There is no exertional dyspnea  Patient is working out on a regular basis  His last EGD and colonoscopy was four years ago  Problem List Items Addressed This Visit     None               Reason for visit is   Chief Complaint   Patient presents with    Virtual Regular Visit        Encounter provider Madelin Oliva MD    Provider located at [address] [place] [phone]      Recent Visits  No visits were found meeting these conditions  Showing recent visits within past 7 days and meeting all other requirements     Future Appointments  No visits were found meeting these conditions  Showing future appointments within next 150 days and meeting all other requirements        The patient was identified by name and date of birth  Shefali Wayne was informed that this is a telemedicine visit and that the visit is being conducted through [facility] and patient was informed that this is a secure, HIPAA-compliant platform  He agrees to proceed     My office door was closed  No one else was in the room  He acknowledged consent and understanding of privacy and security of the video platform   The patient has agreed to participate and understands they can discontinue the visit at any time  Patient is aware this is a billable service  Subjective  Bull Palmer is a 54 y o  male   With history of gastroesophageal reflux disease  Currently in a lot of stress  See above   HPI     Past Medical History:   Diagnosis Date    Melanoma ([facility] [address] ) 10 years ago    Back    Melanoma ([facility] [address] ) 10 years ago    Neck       Past Surgical History:   Procedure Laterality Date    BACK SURGERY      TONSILLECTOMY      UVULECTOMY         Current Outpatient Medications   Medication Sig Dispense Refill    escitalopram (LEXAPRO) 10 mg tablet 1 tablet daily  3    hydroquinone 4 % cream APPLY TOPICALLY TO NECK TWICE A DAY 28 35 g 0    oxiconazole (OXISTAT) 1 % lotion To affected area  Twice daily 60 mL 3    pantoprazole (PROTONIX) 40 mg tablet Take 1 tablet (40 mg total) by mouth daily 90 tablet 0    sildenafil (VIAGRA) 50 MG tablet Take 1 tablet (50 mg total) by mouth daily as needed for erectile dysfunction 10 tablet 3     No current facility-administered medications for this visit  Allergies   Allergen Reactions    Diflucan [Fluconazole] Other (See Comments)     Mouth sores       Review of Systems    Video Exam    There were no vitals filed for this visit  Physical Exam     I spent 15 minutes directly with the patient during this visit      VIRTUAL VISIT DISCLAIMER    Bull Palmer acknowledges that he has consented to an online visit or consultation  He understands that the online visit is based solely on information provided by him, and that, in the absence of a face-to-face physical evaluation by the physician, the diagnosis he receives is both limited and provisional in terms of accuracy and completeness  This is not intended to replace a full medical face-to-face evaluation by the physician  Bull Palmer understands and accepts these terms

## 2021-03-23 ENCOUNTER — OFFICE VISIT (OUTPATIENT)
Dept: DERMATOLOGY | Facility: CLINIC | Age: 56
End: 2021-03-23
Payer: COMMERCIAL

## 2021-03-23 VITALS — WEIGHT: 190 LBS | BODY MASS INDEX: 27.2 KG/M2 | TEMPERATURE: 98.7 F | HEIGHT: 70 IN

## 2021-03-23 DIAGNOSIS — D23.9 ANGIOKERATOMA: ICD-10-CM

## 2021-03-23 DIAGNOSIS — D22.9 MULTIPLE MELANOCYTIC NEVI: ICD-10-CM

## 2021-03-23 DIAGNOSIS — Z85.820 HISTORY OF MELANOMA: ICD-10-CM

## 2021-03-23 DIAGNOSIS — L82.1 SEBORRHEIC KERATOSIS: Primary | ICD-10-CM

## 2021-03-23 DIAGNOSIS — L57.8 ACTINIC SKIN DAMAGE: ICD-10-CM

## 2021-03-23 PROCEDURE — 99214 OFFICE O/P EST MOD 30 MIN: CPT | Performed by: DERMATOLOGY

## 2021-03-23 PROCEDURE — 3008F BODY MASS INDEX DOCD: CPT | Performed by: DERMATOLOGY

## 2021-03-23 PROCEDURE — 1036F TOBACCO NON-USER: CPT | Performed by: DERMATOLOGY

## 2021-03-23 NOTE — PROGRESS NOTES
[facility] Follow Up Note    Patient Name: Marcia Conn  Encounter Date: 03/13/2021    Today's Chief Concerns:  Jay Kauffman Concern #1:  Full Body Exam Hx MM       Current Medications:    Current Outpatient Medications:     escitalopram (LEXAPRO) 10 mg tablet, 1 tablet daily  , Disp: , Rfl: 3    pantoprazole (PROTONIX) 40 mg tablet, Take 1 tablet (40 mg total) by mouth daily, Disp: 90 tablet, Rfl: 0    sildenafil (VIAGRA) 50 MG tablet, Take 1 tablet (50 mg total) by mouth daily as needed for erectile dysfunction, Disp: 10 tablet, Rfl: 3    sucralfate (CARAFATE) 1 g tablet, Take 1 tablet (1 g total) by mouth 4 (four) times a day, Disp: 120 tablet, Rfl: 1    hydroquinone 4 % cream, APPLY TOPICALLY TO NECK TWICE A DAY, Disp: 28 35 g, Rfl: 0    oxiconazole (OXISTAT) 1 % lotion, To affected area  Twice daily, Disp: 60 mL, Rfl: 3    CONSTITUTIONAL:   Vitals:    03/23/21 1630   Temp: 98 7 °F (37 1 °C)   TempSrc: Tympanic   Weight: 86 2 kg (190 lb)   Height: 5' 10" (1 778 m)       Specific Alerts:    Have you been seen by a   [facility] in the last 3 years? No    Are you pregnant or planning to become pregnant? N/A    Are you currently or planning to be nursing or breast feeding? N/A    Allergies   Allergen Reactions    Diflucan [Fluconazole] Other (See Comments)     Mouth sores       May we call your Preferred Phone number to discuss your specific medical information? YES    May we leave a detailed message that includes your specific medical information? YES    Have you traveled outside of the [facility] in the past 3 months? No    Do you currently have a pacemaker or defibrillator? No    Do you have any artificial heart valves, joints, plates, screws, rods, stents, pins, etc? No   - If Yes, were any placed within the last 2 years? Do you require any medications prior to a surgical procedure? No   - If Yes, for which procedure? n/a   - If Yes, what medications to you require? n/a    Are you taking any medications that cause you to bleed more easily ("blood thinners") No    Have you ever experienced a rapid heartbeat with epinephrine? No    Have you ever been treated with "gold" (gold sodium thiomalate) therapy? No    [facility] can help with wrinkles, "laugh lines," facial volume loss, "double chin," "love handles," age spots, and more  Are you interested in learning today about some of the skin enhancement procedures that we offer? (If Yes, please provide more detail) No    Review of Systems:  Have you recently had or currently have any of the following?     · Fever or chills: No  · Night Sweats: No  · Headaches: No  · Weight Gain: No  · Weight Loss: No  · Blurry Vision: No  · Nausea: No  · Vomiting: No  · Diarrhea: No  · Blood in Stool: No  · Abdominal Pain: No  · Itchy Skin: No  · Painful Joints: No  · Swollen Joints: No  · Muscle Pain: No  · Irregular Mole: No  · Sun Burn: No  · Dry Skin: No  · Skin Color Changes: No  · Scar or Keloid: No  · Cold Sores/Fever Blisters: No  · Bacterial Infections/MRSA: No  · Anxiety: No  · Depression: No  · Suicidal or Homicidal Thoughts: No      PSYCH: Normal mood and affect  EYES: Normal conjunctiva  ENT: Normal lips and oral mucosa  CARDIOVASCULAR: No edema  RESPIRATORY: Normal respirations  HEME/LYMPH/IMMUNO:  No regional lymphadenopathy except as noted below in ASSESSMENT AND PLAN BY DIAGNOSIS    FULL ORGAN SYSTEM SKIN EXAM (SKIN)   Hair, Scalp, Ears, Face Normal except as noted below in Assessment   Neck, Cervical Chain Nodes Normal except as noted below in Assessment   Right Arm/Hand/Fingers Normal except as noted below in Assessment   Left Arm/Hand/Fingers Normal except as noted below in Assessment   Chest/Breasts/Axillae Viewed areas Normal except as noted below in Assessment   Abdomen, Umbilicus Normal except as noted below in Assessment   Back/Spine Normal except as noted below in Assessment   Groin/Genitalia/Buttocks Viewed areas Normal except as noted below in Assessment   Right Leg, Foot, Toes Normal except as noted below in Assessment   Left Leg, Foot, Toes Normal except as noted below in Assessment       1  HISTORY OF MELANOMA    Physical Exam:   Anatomic Location Affected:  Neck Back    Morphological Description of Scar:  Well healed surgical scar    Year Treated: 10 years ago    TNM Classification: N/A   Suspected Recurrence: no   Regional adenopathy: no    Assessment and Plan:  Based on a thorough discussion of this condition and the management approach to it (including a comprehensive discussion of the known risks, side effects and potential benefits of treatment), the patient (family) agrees to implement the following specific plan:   Continue 6 months skin exam     What happens at follow-up? The main purpose of follow-up is to detect recurrences early (metastatic melanoma), but it also offers an opportunity to diagnose a new primary melanoma at the first possible opportunity  A second invasive melanoma occurs in 5-10% of melanoma patients and a new melanoma in situ is diagnosed in more than 20% of melanoma patients  Our practice makes the following recommendations for follow-up for patients with invasive melanoma     At-least "monthly" self-skin examinations    Routine skin checks by a board certified dermatologist   Follow-up intervals are "every 3 months" within 2 years of a new melanoma diagnosis; "every 6 months" between 2-4 years of a new melanoma diagnosis; and "annually" after 4 years of a new melanoma diagnosis   Individual patient's needs should be considered before an appropriate follow-up is offered   Provide education and support to help the patient adjust to their illness    Follow-up appointments should include:   A check of the scar where the primary melanoma was removed   Checking the regional lymph nodes   A general skin examination   A full physical examination at least annually by your primary care physician    In those with more advanced primary disease, follow-up may include:   Blood tests   Imaging: ultrasound, X-ray, CT, MRI and PET scan  Most tests are not worthwhile for patients with stage 1 or 2 melanoma unless there are signs or symptoms of disease recurrence or metastasis  No tests are necessary for healthy patients who have remained well for five years or longer after removal of their melanoma  What is the outlook for patients with melanoma?  Melanoma in situ is cured by excision because it has no potential to spread around the body   The risk of spread and ultimate death from invasive melanoma depends on several factors, but the main one is the Breslow thickness of the melanoma at the time it was surgically removed   Metastases are rare for melanomas < 0 75 mm and the risk for tumours 0 75-1 mm thick is about 5%  The risk steadily increases with thickness so that melanomas > 4 mm have a risk of metastasis of about 40%  Melanoma is a potentially serious type of skin cancer, in which there is uncontrolled growth of melanocytes (pigment cells)  Melanoma is sometimes called malignant melanoma  Normal melanocytes are found in the basal layer of the epidermis (the outer layer of skin)  Melanocytes produce a protein called melanin, which protects skin cells by absorbing ultraviolet (UV) radiation  Melanocytes are found in equal numbers in black and white skin, but melanocytes in black skin produce much more melanin  People with dark brown or black skin are very much less likely to be damaged by UV radiation than those with white skin      2  ACTINIC DAMAGE (Chronic Ultraviolet Radiation Exposure)    Physical Exam:   Anatomic Location Affected:  Trunk and extremities    Morphological Description:  Mottled (hyper- and hypo-pigmented), slightly atrophic skin with overlying telangiectasia   Pertinent Positives:   Pertinent Negatives:    Assessment and Plan:  Based on a thorough discussion of this condition and the management approach to it (including a comprehensive discussion of the known risks, side effects and potential benefits of treatment), the patient (family) agrees to implement the following specific plan:   Continue 6 months exam      Photo-aging and actinic damage of skin is common on sites repeatedly exposed to the sun, especially the backs of the hands and the face, most often affecting the ears, nose, cheeks, upper lip, vermilion of the lower lip, temples, forehead and balding scalp  In severely chronically sun-exposed individuals, this condition may also be found on the upper trunk, upper and lower limbs, and dorsum of feet  Photo-aging induces cutaneous changes that vary among individuals, reflecting inherent differences in vulnerability to sun exposure and repair capacity  We discussed further steps to minimize or avoid UV exposure:     Be aware of daily UV index levels  In the [facility], this index is often reported on the [address]   Avoid outdoor activities during the middle of the day   Wear sun-protective clothing (e g , UPF-rated, broad-brimmed hats, long sleeves, and trousers or skirts)   Apply a high sun protection factor (60+) broad-spectrum sunscreen moisturizer at least three times a day to affected areas, year-round  I recommended Neutrogena Daily Defense or CeraVe AM or Aveeno   Do not smoke, and where possible, avoid exposure to pollutants   Get plenty of exercise -- active people appear younger than inactive people   Eat fruit and vegetables daily   Many oral supplements with antioxidant and anti-inflammatory properties have been advocated to mitigate skin aging and to improve skin health  These include carotenoids; polyphenols; chlorophyll; aloe vera; vitamins B, C, and E; red ginseng; squalene; and omega-3 fatty acids  Their role in combatting skin aging is unclear      3  MELANOCYTIC NEVI ("Moles")    Physical Exam:   Anatomic Location Affected:   Mostly on sun-exposed areas of the Trunk and extremities    Morphological Description:  Scattered, 1-4mm round to ovoid, symmetrical-appearing, even bordered, skin colored to dark brown macules/papules, mostly in sun-exposed areas    Assessment and Plan:  Based on a thorough discussion of this condition and the management approach to it (including a comprehensive discussion of the known risks, side effects and potential benefits of treatment), the patient (family) agrees to implement the following specific plan:   Continue 6 months skin exam      Melanocytic Nevi  Melanocytic nevi ("moles") are tan or brown, raised or flat areas of the skin which have an increased number of melanocytes  Melanocytes are the cells in our body which make pigment and account for skin color  Some moles are present at birth (I e , "congenital nevi"), while others come up later in life (i e , "acquired nevi")  The sun can stimulate the body to make more moles  Sunburns are not the only thing that triggers more moles  Chronic sun exposure can do it too  Clinically distinguishing a healthy mole from melanoma may be difficult, even for experienced dermatologists  The "ABCDE's" of moles have been suggested as a means of helping to alert a person to a suspicious mole and the possible increased risk of melanoma  The suggestions for raising alert are as follows:    Asymmetry: Healthy moles tend to be symmetric, while melanomas are often asymmetric  Asymmetry means if you draw a line through the mole, the two halves do not match in color, size, shape, or surface texture  Asymmetry can be a result of rapid enlargement of a mole, the development of a raised area on a previously flat lesion, scaling, ulceration, bleeding or scabbing within the mole    Any mole that starts to demonstrate "asymmetry" should be examined promptly by a board certified dermatologist      Border: Healthy moles tend to have discrete, even borders  The border of a melanoma often blends into the normal skin and does not sharply delineate the mole from normal skin  Any mole that starts to demonstrate "uneven borders" should be examined promptly by a board certified dermatologist      Color: Healthy moles tend to be one color throughout  Melanomas tend to be made up of different colors ranging from dark black, blue, white, or red  Any mole that demonstrates a color change should be examined promptly by a board certified dermatologist      Diameter: Healthy moles tend to be smaller than 0 6 cm in size; an exception are "congenital nevi" that can be larger  Melanomas tend to grow and can often be greater than 0 6 cm (1/4 of an inch, or the size of a pencil eraser)  This is only a guideline, and many normal moles may be larger than 0 6 cm without being unhealthy  Any mole that starts to change in size (small to bigger or bigger to smaller) should be examined promptly by a board certified dermatologist      Evolving: Healthy moles tend to "stay the same "  Melanomas may often show signs of change or evolution such as a change in size, shape, color, or elevation  Any mole that starts to itch, bleed, crust, burn, hurt, or ulcerate or demonstrate a change or evolution should be examined promptly by a board certified dermatologist       Dysplastic Nevi  Dysplastic moles are moles that fit the ABCDE rules of melanoma but are not identified as melanomas when examined under the microscope  They may indicate an increased risk of melanoma in that person  If there is a family history of melanoma, most experts agree that the person may be at an increased risk for developing a melanoma  Experts still do not agree on what dysplastic moles mean in patients without a personal or family history of melanoma  Dysplastic moles are usually larger than common moles and have different colors within it with irregular borders   The appearance can be very similar to a melanoma  Biopsies of dysplastic moles may show abnormalities which are different from a regular mole  Melanoma  Malignant melanoma is a type of skin cancer that can be deadly if it spreads throughout the body  The incidence of melanoma in the [place] is growing faster than any other cancer  Melanoma usually grows near the surface of the skin for a period of time, and then begins to grow deeper into the skin  Once it grows deeper into the skin, the risk of spread to other organs greatly increases  Therefore, early detection and removal of a malignant melanoma may result in a better chance at a complete cure; removal after the tumor has spread may not be as effective, leading to worse clinical outcomes such as death  The true rate of nevus transformation into a melanoma is unknown  It has been estimated that the lifetime risk for any acquired melanocytic nevus on any 21year-old individual transforming into melanoma by age [de-identified] is 0 03% (1 in 3,164) for men and 0 009% (1 in 10,800) for women  The appearance of a "new mole" remains one of the most reliable methods for identifying a malignant melanoma  Occasionally, melanomas appear as rapidly growing, blue-black, dome-shaped bumps within a previous mole or previous area of normal skin  Other times, melanomas are suspected when a mole suddenly appears or changes  Itching, burning, or pain in a pigmented lesion should increase suspicion, but most patients with early melanoma have no skin discomfort whatsoever  Melanoma can occur anywhere on the skin, including areas that are difficult for self-examination  Many melanomas are first noticed by other family members  Suspicious-looking moles may be removed for microscopic examination  You may be able to prevent death from melanoma by doing two simple things:    1  Try to avoid unnecessary sun exposure and protect your skin when it is exposed to the sun    People who live near the equator, people who have intermittent exposures to large amounts of sun, and people who have had sunburns in childhood or adolescence have an increased risk for melanoma  Sun sense and vigilant sun protection may be keys to helping to prevent melanoma  We recommend wearing UPF-rated sun protective clothing and sunglasses whenever possible and applying a moisturizer-sunscreen combination product (SPF 50+) such as Neutrogena Daily Defense to sun exposed areas of skin at least three times a day  2  Have your moles regularly examined by a board certified dermatologist AND by yourself or a family member/friend at home  We recommend that you have your moles examined at least once a year by a board certified dermatologist   Use your birthday as an annual reminder to have your "Birthday Suit" (I e , your skin) examined; it is a nice birthday gift to yourself to know that your skin is healthy appearing! Additionally, at-home self examinations may be helpful for detecting a possible melanoma  Use the ABCDEs we discussed and check your moles once a month at home  4  SEBORRHEIC KERATOSIS; NON-INFLAMED    Physical Exam:   Anatomic Location Affected:  Left torso    Morphological Description:  Flat and raised, waxy, smooth to warty textured, yellow to brownish-grey to dark brown to blackish, discrete, "stuck-on" appearing papules  Additional History of Present Condition:  Patient reports new bumps on the skin  Denies itch, burn, pain, bleeding or ulceration  Present constantly; nothing seems to make it worse or better  No prior treatment        Assessment and Plan:  Based on a thorough discussion of this condition and the management approach to it (including a comprehensive discussion of the known risks, side effects and potential benefits of treatment), the patient (family) agrees to implement the following specific plan:   Benign, no treatment necessary     Seborrheic Keratosis  A seborrheic keratosis is a harmless warty spot that appears during adult life as a common sign of skin aging  Seborrheic keratoses can arise on any area of skin, covered or uncovered, with the usual exception of the palms and soles  They do not arise from mucous membranes  Seborrheic keratoses can have highly variable appearance  Seborrheic keratoses are extremely common  It has been estimated that over 90% of adults over the age of 61 years have one or more of them  They occur in males and females of all races, typically beginning to erupt in the 35s or 45s  They are uncommon under the age of 21 years  The precise cause of seborrhoeic keratoses is not known  Seborrhoeic keratoses are considered degenerative in nature  As time goes by, seborrheic keratoses tend to become more numerous  Some people inherit a tendency to develop a very large number of them; some people may have hundreds of them  The name "seborrheic keratosis" is misleading, because these lesions are not limited to a seborrhoeic distribution (scalp, mid-face, chest, upper back), nor are they formed from sebaceous glands, nor are they associated with sebum -- which is greasy  Seborrheic keratosis may also be called "SK," "Seb K," "basal cell papilloma," "senile wart," or "barnacle "      Researchers have noted:   Eruptive seborrhoeic keratoses can follow sunburn or dermatitis   Skin friction may be the reason they appear in body folds   Viral cause (e g , human papillomavirus) seems unlikely   Stable and clonal mutations or activation of FRFR3, PIK3CA, WILL, AKT1 and EGFR genes are found in seborrhoeic keratoses   Seborrhoeic keratosis can arise from solar lentigo   FRFR3 mutations also arise in solar lentigines   These mutations are associated with increased age and location on the head and neck, suggesting a role of ultraviolet radiation in these lesions   Seborrheic keratoses do not harbour tumour suppressor gene mutations   Epidermal growth factor receptor inhibitors, which are used to treat some cancers, often result in an increase in verrucal (warty) keratoses  There is no easy way to remove multiple lesions on a single occasion  Unless a specific lesion is "inflamed" and is causing pain or stinging/burning or is bleeding, most insurance companies do not authorize treatment  5  ANGIOKERATOMA    Physical Exam:   Anatomic Location Affected:  Scrotum   Morphological Description:  Cherry red to purple papule    Pertinent Positives:   Pertinent Negatives: n/a    Additional History of Present Condition:  Located on the scrotum  Present for few months  Asymptomativ   No treatment attempted     Assessment and Plan:  Based on a thorough discussion of this condition and the management approach to it (including a comprehensive discussion of the known risks, side effects and potential benefits of treatment), the patient (family) agrees to implement the following specific plan:   Benign, no treatment necessary     Scribe Attestation    I,:   am acting as a scribe while in the presence of the attending physician :       I,:   personally performed the services described in this documentation    as scribed in my presence :

## 2021-03-30 DIAGNOSIS — Z23 ENCOUNTER FOR IMMUNIZATION: ICD-10-CM

## 2021-06-02 ENCOUNTER — TELEPHONE (OUTPATIENT)
Dept: DERMATOLOGY | Facility: CLINIC | Age: 56
End: 2021-06-02

## 2021-06-02 DIAGNOSIS — B36.0 TINEA VERSICOLOR: ICD-10-CM

## 2021-06-02 RX ORDER — OXICONAZOLE NITRATE 10 MG/ML
LOTION TOPICAL
Qty: 60 ML | Refills: 3 | Status: SHIPPED | OUTPATIENT
Start: 2021-06-02 | End: 2022-04-06 | Stop reason: SDUPTHER

## 2021-06-02 NOTE — TELEPHONE ENCOUNTER
Patient Called requesting a refill on his Oxistat 1% lotion for his tinea versicolor     Requesting it be sent to [facility] pharmacy in [address] [place] [address] on [facility]   CB# [phone]

## 2021-06-30 DIAGNOSIS — K21.9 GASTROESOPHAGEAL REFLUX DISEASE, UNSPECIFIED WHETHER ESOPHAGITIS PRESENT: ICD-10-CM

## 2021-07-01 RX ORDER — PANTOPRAZOLE SODIUM 40 MG/1
TABLET, DELAYED RELEASE ORAL
Qty: 90 TABLET | Refills: 0 | Status: SHIPPED | OUTPATIENT
Start: 2021-07-01 | End: 2021-09-16

## 2021-09-16 DIAGNOSIS — K21.9 GASTROESOPHAGEAL REFLUX DISEASE, UNSPECIFIED WHETHER ESOPHAGITIS PRESENT: ICD-10-CM

## 2021-09-16 RX ORDER — PANTOPRAZOLE SODIUM 40 MG/1
TABLET, DELAYED RELEASE ORAL
Qty: 90 TABLET | Refills: 0 | Status: SHIPPED | OUTPATIENT
Start: 2021-09-16 | End: 2021-10-14

## 2021-09-23 ENCOUNTER — APPOINTMENT (OUTPATIENT)
Dept: URGENT CARE | Facility: CLINIC | Age: 56
End: 2021-09-23

## 2021-09-23 DIAGNOSIS — Z23 NEEDS FLU SHOT: Primary | ICD-10-CM

## 2021-10-13 DIAGNOSIS — K21.9 GASTROESOPHAGEAL REFLUX DISEASE, UNSPECIFIED WHETHER ESOPHAGITIS PRESENT: ICD-10-CM

## 2021-10-14 RX ORDER — PANTOPRAZOLE SODIUM 40 MG/1
TABLET, DELAYED RELEASE ORAL
Qty: 90 TABLET | Refills: 0 | Status: SHIPPED | OUTPATIENT
Start: 2021-10-14 | End: 2022-03-01

## 2021-11-23 ENCOUNTER — OFFICE VISIT (OUTPATIENT)
Dept: DERMATOLOGY | Facility: CLINIC | Age: 56
End: 2021-11-23
Payer: COMMERCIAL

## 2021-11-23 VITALS — TEMPERATURE: 98.1 F | BODY MASS INDEX: 26.05 KG/M2 | WEIGHT: 182 LBS | HEIGHT: 70 IN

## 2021-11-23 DIAGNOSIS — D48.5 NEOPLASM OF UNCERTAIN BEHAVIOR OF SKIN: ICD-10-CM

## 2021-11-23 DIAGNOSIS — L72.0 EPIDERMAL CYST: ICD-10-CM

## 2021-11-23 DIAGNOSIS — Z85.820 HISTORY OF MELANOMA: ICD-10-CM

## 2021-11-23 DIAGNOSIS — L82.1 SEBORRHEIC KERATOSIS: Primary | ICD-10-CM

## 2021-11-23 PROCEDURE — 88305 TISSUE EXAM BY PATHOLOGIST: CPT | Performed by: STUDENT IN AN ORGANIZED HEALTH CARE EDUCATION/TRAINING PROGRAM

## 2021-11-23 PROCEDURE — 11102 TANGNTL BX SKIN SINGLE LES: CPT | Performed by: DERMATOLOGY

## 2021-11-23 PROCEDURE — 99214 OFFICE O/P EST MOD 30 MIN: CPT | Performed by: DERMATOLOGY

## 2021-11-23 RX ORDER — SERTRALINE HYDROCHLORIDE 100 MG/1
150 TABLET, FILM COATED ORAL DAILY
COMMUNITY

## 2021-12-22 ENCOUNTER — TELEPHONE (OUTPATIENT)
Dept: INTERNAL MEDICINE CLINIC | Facility: CLINIC | Age: 56
End: 2021-12-22

## 2021-12-22 ENCOUNTER — TELEMEDICINE (OUTPATIENT)
Dept: INTERNAL MEDICINE CLINIC | Facility: CLINIC | Age: 56
End: 2021-12-22
Payer: COMMERCIAL

## 2021-12-22 VITALS — BODY MASS INDEX: 25.05 KG/M2 | HEIGHT: 70 IN | WEIGHT: 175 LBS

## 2021-12-22 DIAGNOSIS — Z20.822 EXPOSURE TO COVID-19 VIRUS: Primary | ICD-10-CM

## 2021-12-22 PROCEDURE — 99213 OFFICE O/P EST LOW 20 MIN: CPT | Performed by: INTERNAL MEDICINE

## 2021-12-22 PROCEDURE — U0005 INFEC AGEN DETEC AMPLI PROBE: HCPCS | Performed by: INTERNAL MEDICINE

## 2021-12-22 PROCEDURE — U0003 INFECTIOUS AGENT DETECTION BY NUCLEIC ACID (DNA OR RNA); SEVERE ACUTE RESPIRATORY SYNDROME CORONAVIRUS 2 (SARS-COV-2) (CORONAVIRUS DISEASE [COVID-19]), AMPLIFIED PROBE TECHNIQUE, MAKING USE OF HIGH THROUGHPUT TECHNOLOGIES AS DESCRIBED BY CMS-2020-01-R: HCPCS | Performed by: INTERNAL MEDICINE

## 2021-12-22 PROCEDURE — 1036F TOBACCO NON-USER: CPT | Performed by: INTERNAL MEDICINE

## 2021-12-27 ENCOUNTER — TELEPHONE (OUTPATIENT)
Dept: DERMATOLOGY | Facility: CLINIC | Age: 56
End: 2021-12-27

## 2022-01-19 ENCOUNTER — PROCEDURE VISIT (OUTPATIENT)
Dept: DERMATOLOGY | Facility: CLINIC | Age: 57
End: 2022-01-19
Payer: COMMERCIAL

## 2022-01-19 DIAGNOSIS — C44.612 BASAL CELL CARCINOMA OF RIGHT SHOULDER: Primary | ICD-10-CM

## 2022-01-19 PROCEDURE — 12032 INTMD RPR S/A/T/EXT 2.6-7.5: CPT | Performed by: DERMATOLOGY

## 2022-01-19 PROCEDURE — 88305 TISSUE EXAM BY PATHOLOGIST: CPT | Performed by: PATHOLOGY

## 2022-01-19 PROCEDURE — 11602 EXC TR-EXT MAL+MARG 1.1-2 CM: CPT | Performed by: DERMATOLOGY

## 2022-01-19 NOTE — PROGRESS NOTES
PROCEDURE:  EXCISION WITH INTERMEDIATE LAYERED CLOSURE     Attending: Baliey Walton  Assistant: Celeste Hyatt     Pre-Op Diagnosis: Basal Cell Carcinoma  Post-Op Diagnosis: Same   Benign versus Malignant Malignant      Lesion Anatomic Location: Right shoulder (Previous Accession Number: [id number])  Pre-op size: 0 7 cm x 0 6 cm  Size of defect:  1 7 cm x 1 6 cm (with 0 5 centimeter margins)  Final repaired wound length:  3 5cm    Written and verbal, witnessed informed consent was obtained  I discussed that excision is a method of removing lesions both benign and malignant lesions  A portion of normal skin is often taken to ensure completeness of removal   I reviewed that procedure will include numbing the area,  cutting around and under defect, undermining tissue, and closing the wound with sutures both inside and out  These sutures are usually removed in 7 to 14 days  Risks (bleeding, pain, infection, scarring, recurrence) and benefits discussed  It was discussed with patient that every effort is made to minimize scar, but scarring is influenced also by extrinsic factor such as location, age and genetics  Time Out: performed:  yes  Correct patient: yes  Correct site per Clinic Report: yes  Correct site per Patient Report: yes    LOCAL ANESTHESIA: 1% xylocaine with epi     DESCRIPTION OF PROCEDURE: The patient was brought back into the procedure room, anesthetized locally, prepped and draped in the usual fashion  Using a #15 blade with a scalpel, the lesion was excised in elliptical fashion  The wound was  undermined in the  fascial plane  Hemostasis was achieved with light electrocoagulation  Purpose of undermining was to decrease wound tension and facilitate closure      The wound was closed with subcutaneous sutures as follows:    Deep suture:3-0 Vicryl      Epidermal edge closure was accomplished with superficial sutures as follows:    Superficial suture: 4-0 Ethilon  Superficial suture type: Interrupted     Estimated blood loss less than 3ml  The patient tolerated the procedure well without any complications  The wound was cleaned with sterile saline, dried off, surgical vaseline ointment was applied, and the wound was covered  A pressure dressing was applied for stabilization and light pressure  The patient was given detailed oral and written instructions on postoperative care as detailed in consent  The patient left in good medical condition  POSTOP DISCUSSION DISCUSSION AND INSTRUCTIONS FOR PATIENT      Rationale for Procedure  A skin excision allows the dermatologist to remove a lesion  The procedure involves a local numbing medication and removing the entire lesion  Typically, the lesion is being removed because it is atypical, traumatized, or for significant appearance reasons  The area will be open like a brush burn and allowed to heal    There will be no sutures  Tissue is sent to pathologist who will reconfirm diagnosis and verify completeness of lesion removal     Description of Procedure  We would like to perform a skin excision today  A local anesthetic, similar to the kind that a dentist uses when filling a cavity, will be injected with a very small needle into the skin area to be sampled  The injected skin and tissue underneath should go to sleep and become numbed so that no further pain should be felt  A scalpel will be used to cut around the lesion and tissue will be submitted to pathology for examination  Depending on the diagnosis the lesion will be excised with a certain amount of normal skin to help assure completeness of lesion removal   The physician will discuss in advance the anticipated size and extent of removal    Bleeding will occur, but it will stopped with direct pressure, sutures, and electrocautery  Surgical Vaseline-type ointment will also applied after the procedure to help create a barrier between the wound and the outside world        Risks and Potential Complications  The advantage of a skin excision is that it allows us to remove a problem lesion quickly  Although this usually permits the lesion to heal as soon as possible with the least scarring, there are some risks and potential complications that include but are not limited to the following:  - Some bleeding is normal at time of procedure and some bleeding on gauze is normal  the first few days after surgery  Profuse bleeding and bleeding with swelling and pain should be reported as detailed  below  - Infection is uncommon in skin surgery  Infection should be reported and is indicated by pain, redness, and discharge of purulent material   - Some dull to at time sharp pain could occur initially the day after surgery  Persistent pain or increasing pain days after surgery is not expected and should be reported  - Every effort is made to minimize scar, but location, size, and genetics do play a role in scar appearance  A surgical wound does not achieve its optimal appearance until 6 months  There are several treatments available if scarring would be problematic including scar creams, silicone pad, laser and scar revision   - Skin discoloration can occur especially in people of color  Its important to avoid sun on wound in first 6 months after surgery  Treatment is available if pigment is problematic   - Incomplete removal of the lesion or recurrence of lesion can occur and this would then require further treatment and more surgery   - Nerve Damage/Numbness/Loss of Function is very rare, but is most likely to occur if lesion is large or if it is in a high risk location  - Allergic Reaction to lidocaine is rare  More commonly,  epinephrine is used  with the lidocaine  Occasionally, epinephrine (aka adrenalin) may cause a brief  feeling of anxiety or jitteriness  - The person at the microscope  (pathologist) may provide additional information that was unexpected   This unexpected finding could provoke the need for additional treatment or evaluation  What You Will Need to Do After the Procedure  1  Keep the area clean and dry the first day  Try NOT to remove the bandage for the first day  2  Gently clean the area with soap and water and apply Vaseline ointment (this is over the counter and not a prescription) to the excision  site for up to 2 weeks  3  Apply a clean appropriately sized bandage to area  Gauze and paper tape are recommended for sensitive skin  4  Return for suture removal as instructed (generally 1 week for the face, 2 weeks for the body)  5  Take Acetaminophen (Tylenol) for discomfort, if no contraindications  Do NOT take Ibuprofen or aspirin unless specifically told to do so by your Dermatologist because these medications can make bleeding worse  6  Call our office immediately for signs of infection: fever, chills, increased redness, warmth, tenderness, discomfort/pain, or pus or foul smell coming from the wound  If bleeding is noticed, place a clean cloth over the area and apply firm pressure for thirty minutes  Check the wound ONLY after 30 minutes of direct pressure; do not cheat and sneak a peak, as that does not count  If bleeding persists after 30 minutes of legitimate direct pressure, then try one more round of direct pressure for an additional 10 minutes to the area  Should the bleeding become heavier or not stop after the second attempt, call [facility] directly at [phone] (SKIN) or, if after hours, go to your local Emergency Room/Emergency Department  BCC excised with 0 5cm margins for 1 7cm excision and 3 5cm closure  Well tolerated  S/R in 2 weeks      Scribe Attestation    I,:  Danley Siemens am acting as a scribe while in the presence of the attending physician :       I,:  Angel Kendrick MD personally performed the services described in this documentation    as scribed in my presence :

## 2022-02-01 ENCOUNTER — OFFICE VISIT (OUTPATIENT)
Dept: DERMATOLOGY | Facility: CLINIC | Age: 57
End: 2022-02-01

## 2022-02-01 DIAGNOSIS — Z48.02 VISIT FOR SUTURE REMOVAL: Primary | ICD-10-CM

## 2022-02-01 PROCEDURE — RECHECK: Performed by: DERMATOLOGY

## 2022-02-01 NOTE — PROGRESS NOTES
Suture removal    Date/Time: 2/1/2022 11:47 AM  Performed by: Luis Jeronimo MA  Authorized by: Rodrick Malloy MD   Universal Protocol:  Consent: Verbal consent obtained  Consent given by: patient  Time out: Immediately prior to procedure a "time out" was called to verify the correct patient, procedure, equipment, support staff and site/side marked as required  Patient understanding: patient states understanding of the procedure being performed  Patient consent: the patient's understanding of the procedure matches consent given  Procedure consent: procedure consent matches procedure scheduled  Patient identity confirmed: verbally with patient        Patient location:  Clinic  Location:     Laterality:  Right    Location:  Upper extremity    Upper extremity location:  Shoulder    Shoulder location:  R shoulder  Procedure details: Tools used:  Suture removal kit    Wound appearance:  No sign(s) of infection, good wound healing and clean    Sutures removed: 6  Post-procedure details:     Post-removal:  Band-Aid applied    Patient tolerance of procedure:   Tolerated well, no immediate complications      Scribe Attestation    I,:  Luis Jeronimo MA am acting as a scribe while in the presence of the attending physician :       I,:  Rodrick Malloy MD personally performed the services described in this documentation    as scribed in my presence :

## 2022-03-01 DIAGNOSIS — K21.9 GASTROESOPHAGEAL REFLUX DISEASE, UNSPECIFIED WHETHER ESOPHAGITIS PRESENT: ICD-10-CM

## 2022-03-01 RX ORDER — PANTOPRAZOLE SODIUM 40 MG/1
TABLET, DELAYED RELEASE ORAL
Qty: 90 TABLET | Refills: 0 | Status: SHIPPED | OUTPATIENT
Start: 2022-03-01 | End: 2022-05-29

## 2022-03-19 ENCOUNTER — NURSE TRIAGE (OUTPATIENT)
Dept: OTHER | Facility: OTHER | Age: 57
End: 2022-03-19

## 2022-03-19 DIAGNOSIS — J11.1 FLU: Primary | ICD-10-CM

## 2022-03-19 RX ORDER — OSELTAMIVIR PHOSPHATE 75 MG/1
75 CAPSULE ORAL 2 TIMES DAILY
Qty: 10 CAPSULE | Refills: 0 | Status: SHIPPED | OUTPATIENT
Start: 2022-03-19 | End: 2022-03-24

## 2022-03-19 NOTE — TELEPHONE ENCOUNTER
Regarding: Would like to start [facility], Son has Influenza A  ----- Message from aCleb Zuniga sent at 3/19/2022  9:23 AM EDT -----  " My Son has Influenza A, I was told I should get on [facility] flu right away "

## 2022-03-19 NOTE — TELEPHONE ENCOUNTER
On call okay to send tamiflu to pharmacy  Patient made aware  Reason for Disposition   [1] Influenza EXPOSURE within last 48 hours (2 days) AND [2] NOT HIGH RISK AND [3] strongly requests antiviral medication    Answer Assessment - Initial Assessment Questions  1  TYPE of EXPOSURE: "How were you exposed?" (e g , close contact, not a close contact)      Son is positive with Influenza A    2  DATE of EXPOSURE: "When did the exposure occur?" (e g , hour, days, weeks)      Diagnosed positive yesterday  3  HIGH RISK for COMPLICATIONS: "Do you have any heart or lung problems?" "Do you have a weakened immune system?" (e g , CHF, COPD, asthma, HIV positive, chemotherapy, renal failure, diabetes mellitus, sickle cell anemia)      No     4  SYMPTOMS: "Do you have any symptoms?" (e g , cough, fever, sore throat, difficulty breathing)  Headache      Protocols used: INFLUENZA EXPOSURE-ADULT-AH

## 2022-03-21 NOTE — TELEPHONE ENCOUNTER
Pt called and stated he started the [place] as a preventative because his son was positive for the flu  Health call nurse already prescribed medication   He will call back if virtual is necessary as pt is asymptomatic  Reece Lomax

## 2022-04-06 DIAGNOSIS — B36.0 TINEA VERSICOLOR: ICD-10-CM

## 2022-04-06 RX ORDER — OXICONAZOLE NITRATE 10 MG/ML
LOTION TOPICAL
Qty: 60 ML | Refills: 3 | Status: SHIPPED | OUTPATIENT
Start: 2022-04-06

## 2022-05-03 DIAGNOSIS — N52.9 ERECTILE DYSFUNCTION, UNSPECIFIED ERECTILE DYSFUNCTION TYPE: ICD-10-CM

## 2022-05-03 RX ORDER — SILDENAFIL 50 MG/1
50 TABLET, FILM COATED ORAL DAILY PRN
Qty: 10 TABLET | Refills: 3 | Status: SHIPPED | OUTPATIENT
Start: 2022-05-03

## 2022-05-29 DIAGNOSIS — K21.9 GASTROESOPHAGEAL REFLUX DISEASE, UNSPECIFIED WHETHER ESOPHAGITIS PRESENT: ICD-10-CM

## 2022-05-29 RX ORDER — PANTOPRAZOLE SODIUM 40 MG/1
TABLET, DELAYED RELEASE ORAL
Qty: 90 TABLET | Refills: 0 | Status: SHIPPED | OUTPATIENT
Start: 2022-05-29

## 2022-08-24 DIAGNOSIS — K21.9 GASTROESOPHAGEAL REFLUX DISEASE, UNSPECIFIED WHETHER ESOPHAGITIS PRESENT: ICD-10-CM

## 2022-08-24 RX ORDER — PANTOPRAZOLE SODIUM 40 MG/1
TABLET, DELAYED RELEASE ORAL
Qty: 90 TABLET | Refills: 0 | Status: SHIPPED | OUTPATIENT
Start: 2022-08-24

## 2022-08-25 ENCOUNTER — TELEPHONE (OUTPATIENT)
Dept: GASTROENTEROLOGY | Facility: CLINIC | Age: 57
End: 2022-08-25

## 2022-08-25 DIAGNOSIS — Z12.11 COLON CANCER SCREENING: Primary | ICD-10-CM

## 2022-08-25 NOTE — TELEPHONE ENCOUNTER
Patients GI provider:  Dr Alicia Gomez    Number to return call: ( [phone])    Reason for call: Pt calling regarding a colon/egd recall  He was due in April of 22 for colonoscopy, but no recall for EGD  Has hx of Hutton's and is on protonix  Pt states it has been a while since EGD  Please ask Dr Alicia Gomez if patient should have EGD with colon recall  Thank you!     Scheduled procedure/appointment date if applicable: Apt/procedure

## 2022-08-26 NOTE — TELEPHONE ENCOUNTER
Patients GI provider:  Dr Bandar Espitia     Number to return call: ( [phone])     Reason for call: Pt calling regarding a colon/egd recall  He was due in April of 22 for colonoscopy, but no recall for EGD  Has hx of Hutton's and is on protonix  Pt states it has been a while since EGD  Please ask Dr Bandar Espitia if patient should have EGD with colon recall  Thank you! Scheduled procedure/appointment date if applicable: Apt/procedure     Dr Bandar Espitia , when would you recommend an Egd recall for this pt?  Thanks Olga Jc

## 2022-08-29 DIAGNOSIS — B36.0 TINEA VERSICOLOR: ICD-10-CM

## 2022-08-29 RX ORDER — OXICONAZOLE NITRATE 10 MG/ML
LOTION TOPICAL
Qty: 60 ML | Refills: 3 | Status: SHIPPED | OUTPATIENT
Start: 2022-08-29

## 2022-08-29 NOTE — TELEPHONE ENCOUNTER
Patient called asking for a refill on the oxistat 1% lotion, patient states the tinea versicolor is acting up again

## 2022-10-05 ENCOUNTER — OFFICE VISIT (OUTPATIENT)
Dept: DERMATOLOGY | Facility: CLINIC | Age: 57
End: 2022-10-05
Payer: COMMERCIAL

## 2022-10-05 VITALS — HEIGHT: 70 IN | WEIGHT: 177.8 LBS | BODY MASS INDEX: 25.45 KG/M2

## 2022-10-05 DIAGNOSIS — L82.1 SEBORRHEIC KERATOSIS: ICD-10-CM

## 2022-10-05 DIAGNOSIS — D48.5 NEOPLASM OF UNCERTAIN BEHAVIOR OF SKIN: ICD-10-CM

## 2022-10-05 DIAGNOSIS — B36.0 TINEA VERSICOLOR: ICD-10-CM

## 2022-10-05 DIAGNOSIS — Z85.828 HISTORY OF BASAL CELL CARCINOMA: ICD-10-CM

## 2022-10-05 DIAGNOSIS — Z85.820 HISTORY OF MELANOMA: Primary | ICD-10-CM

## 2022-10-05 PROCEDURE — 88305 TISSUE EXAM BY PATHOLOGIST: CPT | Performed by: PATHOLOGY

## 2022-10-05 PROCEDURE — 88342 IMHCHEM/IMCYTCHM 1ST ANTB: CPT | Performed by: PATHOLOGY

## 2022-10-05 PROCEDURE — 88341 IMHCHEM/IMCYTCHM EA ADD ANTB: CPT | Performed by: PATHOLOGY

## 2022-10-05 PROCEDURE — 88344 IMHCHEM/IMCYTCHM EA MLT ANTB: CPT | Performed by: PATHOLOGY

## 2022-10-05 PROCEDURE — 99214 OFFICE O/P EST MOD 30 MIN: CPT | Performed by: DERMATOLOGY

## 2022-10-05 PROCEDURE — 11102 TANGNTL BX SKIN SINGLE LES: CPT | Performed by: DERMATOLOGY

## 2022-10-05 RX ORDER — KETOCONAZOLE 20 MG/ML
SHAMPOO TOPICAL
Qty: 120 ML | Refills: 5 | Status: SHIPPED | OUTPATIENT
Start: 2022-10-05

## 2022-10-05 NOTE — PROGRESS NOTES
[facility] Follow Up Note    Patient Name: Wing Stover  Encounter Date: 10/05/2022    Today's Chief Concerns:  Concern #1:  Full Body Check       Current Medications:    Current Outpatient Medications:     ketoconazole (NIZORAL) 2 % shampoo, Daily for 2 weeks straight and then on "Mondays, Wednesdays and Fridays":  Lather into scalp and skin on face, neck, chest, and back; leave on for 5 minutes and then rinse off completely  , Disp: 120 mL, Rfl: 5    oxiconazole (Oxistat) 1 % lotion, To affected area  Twice daily, Disp: 60 mL, Rfl: 3    pantoprazole (PROTONIX) 40 mg tablet, TAKE 1 TABLET BY MOUTH EVERY DAY, Disp: 90 tablet, Rfl: 0    sertraline (ZOLOFT) 100 mg tablet, Take 150 mg by mouth daily, Disp: , Rfl:     sildenafil (VIAGRA) 50 MG tablet, Take 1 tablet (50 mg total) by mouth daily as needed for erectile dysfunction, Disp: 10 tablet, Rfl: 3    escitalopram (LEXAPRO) 10 mg tablet, 1 tablet daily  (Patient not taking: No sig reported), Disp: , Rfl: 3    hydroquinone 4 % cream, APPLY TOPICALLY TO NECK TWICE A DAY (Patient not taking: Reported on 10/5/2022), Disp: 28 35 g, Rfl: 0    sucralfate (CARAFATE) 1 g tablet, Take 1 tablet (1 g total) by mouth 4 (four) times a day (Patient not taking: Reported on 10/5/2022), Disp: 120 tablet, Rfl: 1    CONSTITUTIONAL:   Vitals:    10/05/22 1303   Weight: 80 6 kg (177 lb 12 8 oz)   Height: 5' 10" (1 778 m)       Specific Alerts:    Have you been seen by a [facility]ologist in the last 3 years? YES    Are you pregnant or planning to become pregnant? N/A    Are you currently or planning to be nursing or breast feeding? N/A    Allergies   Allergen Reactions    Diflucan [Fluconazole] Other (See Comments)     Mouth sores       May we call your Preferred Phone number to discuss your specific medical information? YES    May we leave a detailed message that includes your specific medical information?  YES    Have you traveled outside of the [facility] in the past 3 months? No    Do you currently have a pacemaker or defibrillator? No    Do you have any artificial heart valves, joints, plates, screws, rods, stents, pins, etc? No    Do you require any medications prior to a surgical procedure? No    Are you taking any medications that cause you to bleed more easily ("blood thinners") No    Have you ever experienced a rapid heartbeat with epinephrine? No    Have you ever been treated with "gold" (gold sodium thiomalate) therapy? No    [facility] can help with wrinkles, "laugh lines," facial volume loss, "double chin," "love handles," age spots, and more  Are you interested in learning today about some of the skin enhancement procedures that we offer? (If Yes, please provide more detail) No    Review of Systems:  Have you recently had or currently have any of the following?     Fever or chills: No  Night Sweats: No  Headaches: No  Weight Gain: No  Weight Loss: No  Blurry Vision: No  Nausea: No  Vomiting: No  Diarrhea: No  Blood in Stool: No  Abdominal Pain: No  Itchy Skin: No  Painful Joints: No  Swollen Joints: No  Muscle Pain: No  Irregular Mole: No  Sun Burn: No  Dry Skin: No  Skin Color Changes: No  Scar or Keloid: YES  Cold Sores/Fever Blisters: No  Bacterial Infections/MRSA: No  Anxiety: No  Depression: No  Suicidal or Homicidal Thoughts: No      PSYCH: Normal mood and affect  EYES: Normal conjunctiva  ENT: Normal lips and oral mucosa  CARDIOVASCULAR: No edema  RESPIRATORY: Normal respirations  HEME/LYMPH/IMMUNO:  No regional lymphadenopathy except as noted below in ASSESSMENT AND PLAN BY DIAGNOSIS    FULL ORGAN SYSTEM SKIN EXAM (SKIN)   Hair, Scalp, Ears, Face Normal except as noted below in Assessment   Neck, Cervical Chain Nodes Normal except as noted below in Assessment   Right Arm/Hand/Fingers Normal except as noted below in Assessment   Left Arm/Hand/Fingers Normal except as noted below in Assessment Chest/Breasts/Axillae Viewed areas Normal except as noted below in Assessment   Abdomen, Umbilicus Normal except as noted below in Assessment   Back/Spine Normal except as noted below in Assessment   Groin/Genitalia/Buttocks Viewed areas Normal except as noted below in Assessment   Right Leg, Foot, Toes Normal except as noted below in Assessment   Left Leg, Foot, Toes Normal except as noted below in Assessment       1  HISTORY OF MELANOMA     Physical Exam:  Anatomic Location Affected:  Neck, Back, Calf  Morphological Description of Scar:  Well healed scar  Year Treated: 10-15 years ago  Suspected Recurrence: no  Regional adenopathy: no     Additional History of Present Condition:  Patient status post Melanoma of neck, back and calf      Assessment and Plan:  Based on a thorough discussion of this condition and the management approach to it (including a comprehensive discussion of the known risks, side effects and potential benefits of treatment), the patient (family) agrees to implement the following specific plan:  Continue skin exams with Dermatology  Apply SPF 48 or higher multiple times a day  Wear sun protecting clothing and hats  Monitor skin for any changes      What happens at follow-up? The main purpose of follow-up is to detect recurrences early (metastatic melanoma), but it also offers an opportunity to diagnose a new primary melanoma at the first possible opportunity  A second invasive melanoma occurs in 5-10% of melanoma patients and a new melanoma in situ is diagnosed in more than 20% of melanoma patients      Our practice makes the following recommendations for follow-up for patients with invasive melanoma    At-least "monthly" self-skin examinations   Routine skin checks by a board certified dermatologist  Follow-up intervals are "every 3 months" within 2 years of a new melanoma diagnosis; "every 6 months" between 2-4 years of a new melanoma diagnosis; and "annually" after 4 years of a new melanoma diagnosis  Individual patient's needs should be considered before an appropriate follow-up is offered  Provide education and support to help the patient adjust to their illness     Follow-up appointments should include:  A check of the scar where the primary melanoma was removed  Checking the regional lymph nodes  A general skin examination  A full physical examination at least annually by your primary care physician     In those with more advanced primary disease, follow-up may include:  Blood tests  Imaging: ultrasound, X-ray, CT, MRI and PET scan      Most tests are not worthwhile for patients with stage 1 or 2 melanoma unless there are signs or symptoms of disease recurrence or metastasis  No tests are necessary for healthy patients who have remained well for five years or longer after removal of their melanoma      What is the outlook for patients with melanoma? Melanoma in situ is cured by excision because it has no potential to spread around the body  The risk of spread and ultimate death from invasive melanoma depends on several factors, but the main one is the Breslow thickness of the melanoma at the time it was surgically removed  Metastases are rare for melanomas < 0 75 mm and the risk for tumours 0 75-1 mm thick is about 5%  The risk steadily increases with thickness so that melanomas > 4 mm have a risk of metastasis of about 40%     Melanoma is a potentially serious type of skin cancer, in which there is uncontrolled growth of melanocytes (pigment cells)  Melanoma is sometimes called malignant melanoma  Normal melanocytes are found in the basal layer of the epidermis (the outer layer of skin)  Melanocytes produce a protein called melanin, which protects skin cells by absorbing ultraviolet (UV) radiation  Melanocytes are found in equal numbers in black and white skin, but melanocytes in black skin produce much more melanin   People with dark brown or black skin are very much less likely to be damaged by UV radiation than those with white skin         2  HISTORY OF BASAL CELL CARCINOMA    Physical Exam:  Anatomic Location Affected:  Right Shoulder   Morphological Description of scar:  Well healed scar   Suspected Recurrence: No  Pertinent Positives:  Pertinent Negatives: Additional History of Present Condition:  History of basal cell carcinoma with no sign of recurrence    Assessment and Plan:  Based on a thorough discussion of this condition and the management approach to it (including a comprehensive discussion of the known risks, side effects and potential benefits of treatment), the patient (family) agrees to implement the following specific plan:  No sign of recurrence   Continue skin examinations     How can basal cell carcinoma be prevented? The most important way to prevent BCC is to avoid sunburn  This is especially important in childhood and early life  Fair skinned individuals and those with a personal or family history of BCC should protect their skin from sun exposure daily, year-round and lifelong  Stay indoors or under the shade in the middle of the day   Wear covering clothing   Apply high protection factor SPF50+ broad-spectrum sunscreens generously to exposed skin if outdoors   Avoid indoor tanning (sun beds, solaria)  Oral nicotinamide (vitamin B3) in a dose of 500 mg twice daily may reduce the number and severity of BCCs  What is the outlook for basal cell carcinoma? Most BCCs are cured by treatment  Cure is most likely if treatment is undertaken when the lesion is small  About 50% of people with BCC develop a second one within 3 years of the first  They are also at increased risk of other skin cancers, especially melanoma  Regular self-skin examinations and long-term annual skin checks by an experienced health professional are recommended        3  TINEA VERSICOLOR    Physical Exam:  Anatomic Location Affected:  Scattered   Morphological Description:  Hypopigmented scaling macules upper chest and back  Pertinent Positives:  Pertinent Negatives: Additional History of Present Condition:  Patient has tried Oxistat 1% lotion  Assessment and Plan:  Based on a thorough discussion of this condition and the management approach to it (including a comprehensive discussion of the known risks, side effects and potential benefits of treatment), the patient (family) agrees to implement the following specific plan:  Can use Ketoconazole Shampoo 2%- Daily for 2 weeks straight and then on "Mondays, Wednesdays and Fridays":  Lather into scalp and skin on face, neck, chest, and back; leave on for 5 minutes and then rinse off completely  What is tinea versicolor? Tinea versicolor or pityriasis versicolor is a type of fungal infection on the skin due to a yeast that lives on all of us  It Is due an overgrowth of a type of yeast called Malassezia furfur, which feeds on oils in the skin and thrives in warm, humid environments  Anyone can develop tinea versicolor, but it is more common during the summer months and in tropical climates  Those who tend to sweat more heavily are also at higher risk  Although it is not considered infectious, multiple family members can be affected  Teens and young adults are most susceptible due to having oily skin   Affects people of all skin colors   Weakened immune system predisposes to development     What are the clinical symptoms of tinea versicolor? The first sign of tinea versicolor is often spots on the skin  They can be lighter or darker than surrounding skin, with colors ranging from white, pink, tan, to brown     The spots are dry, scaly, and sometimes itchy   Can appear anywhere on the body, but more commonly over the neck, trunk, and arms   Spots can grow together forming larger patches   May disappear when temperature drops and return once it becomes warm again  Pale spots can be confused with vitiligo    How do we diagnose tinea versicolor? Tinea versicolor is usually diagnosed with a history and physical examination  However, the following tests may be useful for confirmation when in doubt  Wood lamp (black light) examination-- yellow-green glow may be observed in affected areas  Microscopy using potassium hydroxide (KOH) to examine skin scrapings  Fungal culture--this is usually reported to be negative, as it is quite difficult to persuade the yeasts to grow in a laboratory  Skin biopsy--fungal elements may be seen within the outer cells of the skin (stratum corneum) on histopathology    How do we treat tinea versicolor? There are many different options to treat tinea versicolor  The treatment chosen may depend on how thick the spots have grown and how much of the body has been affected  Mild tinea versicolor can be treated with primarily topical antifungal agents  These include:  Ketoconazole cream/shampoo  Selenium sulfide   Terbinafine gel   Ciclopirox cream/solution   Propylene glycol solution   Sodium thiosulphate solution   Topical medications should be applied widely to affected areas before bedtime for between three days to two weeks depending on your dermatologists recommendation  Use of medicated cleansers once or twice a month may prevent recurrence in those who have has multiple bouts of yeast overgrowth     For extensive skin involvement or after failure of topical medications, oral antifungal agents such as itraconazole and fluconazole can be used  Oral terbinafine used to treat dermatophyte infections is not effective against tinea versicolor  Vigorous exercise an hour after taking the medication may help sweat it onto the skin surface and enhance clearance of the yeast      4   SEBORRHEIC KERATOSIS; NON-INFLAMED    Physical Exam:  Anatomic Location Affected:  Scattered  Morphological Description:  Flat and raised, waxy, smooth to warty textured, yellow to brownish-grey to dark brown to blackish, discrete, "stuck-on" appearing papules  Pertinent Positives:  Pertinent Negatives: Additional History of Present Condition:  Patient reports new bumps on the skin  Denies itch, burn, pain, bleeding or ulceration  Present constantly; nothing seems to make it worse or better  No prior treatment  Assessment and Plan:  Based on a thorough discussion of this condition and the management approach to it (including a comprehensive discussion of the known risks, side effects and potential benefits of treatment), the patient (family) agrees to implement the following specific plan:  Reassured benign     Seborrheic Keratosis  A seborrheic keratosis is a harmless warty spot that appears during adult life as a common sign of skin aging  Seborrheic keratoses can arise on any area of skin, covered or uncovered, with the usual exception of the palms and soles  They do not arise from mucous membranes  Seborrheic keratoses can have highly variable appearance  Seborrheic keratoses are extremely common  It has been estimated that over 90% of adults over the age of 61 years have one or more of them  They occur in males and females of all races, typically beginning to erupt in the 35s or 45s  They are uncommon under the age of 21 years  The precise cause of seborrhoeic keratoses is not known  Seborrhoeic keratoses are considered degenerative in nature  As time goes by, seborrheic keratoses tend to become more numerous  Some people inherit a tendency to develop a very large number of them; some people may have hundreds of them  The name "seborrheic keratosis" is misleading, because these lesions are not limited to a seborrhoeic distribution (scalp, mid-face, chest, upper back), nor are they formed from sebaceous glands, nor are they associated with sebum -- which is greasy    Seborrheic keratosis may also be called "SK," "Seb K," "basal cell papilloma," "senile wart," or "barnacle "      Researchers have noted:  Eruptive seborrhoeic keratoses can follow sunburn or dermatitis  Skin friction may be the reason they appear in body folds  Viral cause (e g , human papillomavirus) seems unlikely  Stable and clonal mutations or activation of FRFR3, PIK3CA, WILL, AKT1 and EGFR genes are found in seborrhoeic keratoses  Seborrhoeic keratosis can arise from solar lentigo  FRFR3 mutations also arise in solar lentigines  These mutations are associated with increased age and location on the head and neck, suggesting a role of ultraviolet radiation in these lesions  Seborrheic keratoses do not harbour tumour suppressor gene mutations  Epidermal growth factor receptor inhibitors, which are used to treat some cancers, often result in an increase in verrucal (warty) keratoses  There is no easy way to remove multiple lesions on a single occasion  Unless a specific lesion is "inflamed" and is causing pain or stinging/burning or is bleeding, most insurance companies do not authorize treatment  5  NEOPLASM OF UNCERTAIN BEHAVIOR OF SKIN    Physical Exam:  (Anatomic Location); (Size and Morphological Description); (Differential Diagnosis):  Specimen A; right upper buttock; 0 5 cm; mildly dysplastic brown macule; differential diagnosis; dysplastic nevus rule out higher grade    Pertinent Positives:  Pertinent Negatives: Additional History of Present Condition:  N/A    Assessment and Plan:  I have discussed with the patient that a sample of skin via a "skin biopsy would be potentially helpful to further make a specific diagnosis under the microscope  Based on a thorough discussion of this condition and the management approach to it (including a comprehensive discussion of the known risks, side effects and potential benefits of treatment), the patient (family) agrees to implement the following specific plan:    Procedure:  Skin Biopsy    After a thorough discussion of treatment options and risk/benefits/alternatives (including but not limited to local pain, scarring, dyspigmentation, blistering, possible superinfection, and inability to confirm a diagnosis via histopathology), verbal and written consent were obtained and portion of the rash was biopsied for tissue sample  See below for consent that was obtained from patient and subsequent Procedure Note  PROCEDURE TANGENTIAL (SHAVE) BIOPSY NOTE:    Performing Physician: Dr Villagomez  Anatomic Location; Clinical Description with size (cm); Pre-Op Diagnosis:   Specimen A; right upper buttock; 0 5 cm; mildly dysplastic brown macule; differential diagnosis; dysplastic nevus rule out higher grade    Post-op diagnosis: Same     Local anesthesia: 1% Lidocaine HCL     Topical anesthesia: None    Hemostasis: Aluminum chloride       After obtaining informed consent  at which time there was a discussion about the purpose of biopsy  and low risks of infection and bleeding  The area was prepped and draped in the usual fashion  Anesthesia was obtained with 1% lidocaine with epinephrine  A shave biopsy to an appropriate sampling depth was obtained by Shave (Dermablade or 15 blade) The resulting wound was covered with surgical ointment and bandaged appropriately  The patient tolerated the procedure well without complications and was without signs of functional compromise  Specimen has been sent for review by Dermatopathology  Standard post-procedure care has been explained and has been included in written form within the patient's copy of Informed Consent  INFORMED CONSENT DISCUSSION AND POST-OPERATIVE INSTRUCTIONS FOR PATIENT    I   RATIONALE FOR PROCEDURE  I understand that a skin biopsy allows the Dermatologist to test a lesion or rash under the microscope to obtain a diagnosis  It usually involves numbing the area with numbing medication and removing a small piece of skin; sometimes the area will be closed with sutures  In this specific procedure, sutures are not usually needed    If any sutures are placed, then they are usually need to be removed in 2 weeks or less  I understand that my Dermatologist recommends that a skin "shave" biopsy be performed today  A local anesthetic, similar to the kind that a dentist uses when filling a cavity, will be injected with a very small needle into the skin area to be sampled  The injected skin and tissue underneath "will go to sleep and become numb so no pain should be felt afterwards  An instrument shaped like a tiny "razor blade" (shave biopsy instrument) will be used to cut a small piece of tissue and skin from the area so that a sample of tissue can be taken and examined more closely under the microscope  A slight amount of bleeding will occur, but it will be stopped with direct pressure and a pressure bandage and any other appropriate methods  I understands that a scar will form where the wound was created  Surgical ointment will be applied to help protect the wound  Sutures are not usually needed  II   RISKS AND POTENTIAL COMPLICATIONS   I understand the risks and potential complications of a skin biopsy include but are not limited to the following:  Bleeding  Infection  Pain  Scar/keloid  Skin discoloration  Incomplete Removal  Recurrence  Nerve Damage/Numbness/Loss of Function  Allergic Reaction to Anesthesia  Biopsies are diagnostic procedures and based on findings additional treatment or evaluation may be required  Loss or destruction of specimen resulting in no additional findings    My Dermatologist has explained to me the nature of the condition, the nature of the procedure, and the benefits to be reasonably expected compared with alternative approaches  My Dermatologist has discussed the likelihood of major risks or complications of this procedure including the specific risks listed above, such as bleeding, infection, and scarring/keloid  I understand that a scar is expected after this procedure    I understand that my physician cannot predict if the scar will form a "keloid," which extends beyond the borders of the wound that is created  A keloid is a thick, painful, and bumpy scar  A keloid can be difficult to treat, as it does not always respond well to therapy, which includes injecting cortisone directly into the keloid every few weeks  While this usually reduces the pain and size of the scar, it does not eliminate it  I understand that photographs may be taken before and after the procedure  These will be maintained as part of the medical providers confidential records and may not be made available to me  I further authorize the medical provider to use the photographs for teaching purposes or to illustrate scientific papers, books, or lectures if in his/her judgment, medical research, education, or science may benefit from its use  I have had an opportunity to fully inquire about the risks and benefits of this procedure and its alternatives  I have been given ample time and opportunity to ask questions and to seek a second opinion if I wished to do so  I acknowledge that there have specifically been no guarantees as to the cosmetic results from the procedure  I am aware that with any procedure there is always the possibility of an unexpected complication  III  POST-PROCEDURAL CARE (WHAT YOU WILL NEED TO DO "AFTER THE BIOPSY" TO OPTIMIZE HEALING)    Keep the area clean and dry  Try NOT to remove the bandage or get it wet for the first 24 hours  Gently clean the area and apply surgical ointment (such as Vaseline petrolatum ointment, which is available "over the counter" and not a prescription) to the biopsy site for up to 2 weeks straight  This acts to protect the wound from the outside world  Sutures are not usually placed in this procedure  If any sutures were placed, return for suture removal as instructed (generally 1 week for the face, 2 weeks for the body)        Take Acetaminophen (Tylenol) for discomfort, if no contraindications  Ibuprofen or aspirin could make bleeding worse  Call our office immediately for signs of infection: fever, chills, increased redness, warmth, tenderness, discomfort/pain, or pus or foul smell coming from the wound  WHAT TO DO IF THERE IS ANY BLEEDING? If a small amount of bleeding is noticed, place a clean cloth over the area and apply firm pressure for ten minutes  Check the wound after 10 minutes of direct pressure  If bleeding persists, try one more time for an additional 10 minutes of direct pressure on the area  If the bleeding becomes heavier or does not stop after the second attempt, or if you have any other questions about this procedure, then please call your [place]  [facility] by calling [phone] (SKIN)  I hereby acknowledge that I have reviewed and verified the site with my Dermatologist and have requested and authorized my Dermatologist to proceed with the procedure                      Scribe Attestation    I,:  Magi Borden am acting as a scribe while in the presence of the attending physician :       I,:  Mihai Coulter MD personally performed the services described in this documentation    as scribed in my presence :

## 2022-10-05 NOTE — PATIENT INSTRUCTIONS
1  HISTORY OF MELANOMA       Assessment and Plan:  Based on a thorough discussion of this condition and the management approach to it (including a comprehensive discussion of the known risks, side effects and potential benefits of treatment), the patient (family) agrees to implement the following specific plan:  Continue skin exams with Dermatology  Apply SPF 48 or higher multiple times a day  Wear sun protecting clothing and hats  Monitor skin for any changes  What happens at follow-up? The main purpose of follow-up is to detect recurrences early (metastatic melanoma), but it also offers an opportunity to diagnose a new primary melanoma at the first possible opportunity  A second invasive melanoma occurs in 5-10% of melanoma patients and a new melanoma in situ is diagnosed in more than 20% of melanoma patients  Our practice makes the following recommendations for follow-up for patients with invasive melanoma  At-least "monthly" self-skin examinations   Routine skin checks by a board certified dermatologist  Follow-up intervals are "every 3 months" within 2 years of a new melanoma diagnosis; "every 6 months" between 2-4 years of a new melanoma diagnosis; and "annually" after 4 years of a new melanoma diagnosis  Individual patient's needs should be considered before an appropriate follow-up is offered  Provide education and support to help the patient adjust to their illness     Follow-up appointments should include:  A check of the scar where the primary melanoma was removed  Checking the regional lymph nodes  A general skin examination  A full physical examination at least annually by your primary care physician     In those with more advanced primary disease, follow-up may include:  Blood tests  Imaging: ultrasound, X-ray, CT, MRI and PET scan  Most tests are not worthwhile for patients with stage 1 or 2 melanoma unless there are signs or symptoms of disease recurrence or metastasis   No tests are necessary for healthy patients who have remained well for five years or longer after removal of their melanoma  What is the outlook for patients with melanoma? Melanoma in situ is cured by excision because it has no potential to spread around the body  The risk of spread and ultimate death from invasive melanoma depends on several factors, but the main one is the Breslow thickness of the melanoma at the time it was surgically removed  Metastases are rare for melanomas < 0 75 mm and the risk for tumours 0 75-1 mm thick is about 5%  The risk steadily increases with thickness so that melanomas > 4 mm have a risk of metastasis of about 40%  Melanoma is a potentially serious type of skin cancer, in which there is uncontrolled growth of melanocytes (pigment cells)  Melanoma is sometimes called malignant melanoma  Normal melanocytes are found in the basal layer of the epidermis (the outer layer of skin)  Melanocytes produce a protein called melanin, which protects skin cells by absorbing ultraviolet (UV) radiation  Melanocytes are found in equal numbers in black and white skin, but melanocytes in black skin produce much more melanin  People with dark brown or black skin are very much less likely to be damaged by UV radiation than those with white skin  2  HISTORY OF BASAL CELL CARCINOMA    Assessment and Plan:  Based on a thorough discussion of this condition and the management approach to it (including a comprehensive discussion of the known risks, side effects and potential benefits of treatment), the patient (family) agrees to implement the following specific plan:  No sign of recurrence   Continue skin examinations     How can basal cell carcinoma be prevented? The most important way to prevent BCC is to avoid sunburn  This is especially important in childhood and early life   Fair skinned individuals and those with a personal or family history of BCC should protect their skin from sun exposure daily, year-round and lifelong  Stay indoors or under the shade in the middle of the day   Wear covering clothing   Apply high protection factor SPF50+ broad-spectrum sunscreens generously to exposed skin if outdoors   Avoid indoor tanning (sun beds, solaria)  Oral nicotinamide (vitamin B3) in a dose of 500 mg twice daily may reduce the number and severity of BCCs  What is the outlook for basal cell carcinoma? Most BCCs are cured by treatment  Cure is most likely if treatment is undertaken when the lesion is small  About 50% of people with BCC develop a second one within 3 years of the first  They are also at increased risk of other skin cancers, especially melanoma  Regular self-skin examinations and long-term annual skin checks by an experienced health professional are recommended  3  TINEA VERSICOLOR    Assessment and Plan:  Based on a thorough discussion of this condition and the management approach to it (including a comprehensive discussion of the known risks, side effects and potential benefits of treatment), the patient (family) agrees to implement the following specific plan:  Can use Ketoconazole Shampoo 2%- Daily for 2 weeks straight and then on "Mondays, Wednesdays and Fridays":  Lather into scalp and skin on face, neck, chest, and back; leave on for 5 minutes and then rinse off completely  What is tinea versicolor? Tinea versicolor or pityriasis versicolor is a type of fungal infection on the skin due to a yeast that lives on all of us  It Is due an overgrowth of a type of yeast called Malassezia furfur, which feeds on oils in the skin and thrives in warm, humid environments  Anyone can develop tinea versicolor, but it is more common during the summer months and in tropical climates  Those who tend to sweat more heavily are also at higher risk  Although it is not considered infectious, multiple family members can be affected     Teens and young adults are most susceptible due to having oily skin   Affects people of all skin colors   Weakened immune system predisposes to development     What are the clinical symptoms of tinea versicolor? The first sign of tinea versicolor is often spots on the skin  They can be lighter or darker than surrounding skin, with colors ranging from white, pink, tan, to brown  The spots are dry, scaly, and sometimes itchy   Can appear anywhere on the body, but more commonly over the neck, trunk, and arms   Spots can grow together forming larger patches   May disappear when temperature drops and return once it becomes warm again  Pale spots can be confused with vitiligo    How do we diagnose tinea versicolor? Tinea versicolor is usually diagnosed with a history and physical examination  However, the following tests may be useful for confirmation when in doubt  Wood lamp (black light) examination-- yellow-green glow may be observed in affected areas  Microscopy using potassium hydroxide (KOH) to examine skin scrapings  Fungal culture--this is usually reported to be negative, as it is quite difficult to persuade the yeasts to grow in a laboratory  Skin biopsy--fungal elements may be seen within the outer cells of the skin (stratum corneum) on histopathology    How do we treat tinea versicolor? There are many different options to treat tinea versicolor  The treatment chosen may depend on how thick the spots have grown and how much of the body has been affected  Mild tinea versicolor can be treated with primarily topical antifungal agents  These include:  Ketoconazole cream/shampoo  Selenium sulfide   Terbinafine gel   Ciclopirox cream/solution   Propylene glycol solution   Sodium thiosulphate solution   Topical medications should be applied widely to affected areas before bedtime for between three days to two weeks depending on your dermatologists recommendation     Use of medicated cleansers once or twice a month may prevent recurrence in those who have has multiple bouts of yeast overgrowth     For extensive skin involvement or after failure of topical medications, oral antifungal agents such as itraconazole and fluconazole can be used  Oral terbinafine used to treat dermatophyte infections is not effective against tinea versicolor  Vigorous exercise an hour after taking the medication may help sweat it onto the skin surface and enhance clearance of the yeast      4  SEBORRHEIC KERATOSIS; NON-INFLAMED    Assessment and Plan:  Based on a thorough discussion of this condition and the management approach to it (including a comprehensive discussion of the known risks, side effects and potential benefits of treatment), the patient (family) agrees to implement the following specific plan:  Reassured benign     Seborrheic Keratosis  A seborrheic keratosis is a harmless warty spot that appears during adult life as a common sign of skin aging  Seborrheic keratoses can arise on any area of skin, covered or uncovered, with the usual exception of the palms and soles  They do not arise from mucous membranes  Seborrheic keratoses can have highly variable appearance  Seborrheic keratoses are extremely common  It has been estimated that over 90% of adults over the age of 61 years have one or more of them  They occur in males and females of all races, typically beginning to erupt in the 35s or 45s  They are uncommon under the age of 21 years  The precise cause of seborrhoeic keratoses is not known  Seborrhoeic keratoses are considered degenerative in nature  As time goes by, seborrheic keratoses tend to become more numerous  Some people inherit a tendency to develop a very large number of them; some people may have hundreds of them  The name "seborrheic keratosis" is misleading, because these lesions are not limited to a seborrhoeic distribution (scalp, mid-face, chest, upper back), nor are they formed from sebaceous glands, nor are they associated with sebum -- which is greasy  Seborrheic keratosis may also be called "SK," "Seb K," "basal cell papilloma," "senile wart," or "barnacle "      Researchers have noted:  Eruptive seborrhoeic keratoses can follow sunburn or dermatitis  Skin friction may be the reason they appear in body folds  Viral cause (e g , human papillomavirus) seems unlikely  Stable and clonal mutations or activation of FRFR3, PIK3CA, WILL, AKT1 and EGFR genes are found in seborrhoeic keratoses  Seborrhoeic keratosis can arise from solar lentigo  FRFR3 mutations also arise in solar lentigines  These mutations are associated with increased age and location on the head and neck, suggesting a role of ultraviolet radiation in these lesions  Seborrheic keratoses do not harbour tumour suppressor gene mutations  Epidermal growth factor receptor inhibitors, which are used to treat some cancers, often result in an increase in verrucal (warty) keratoses  There is no easy way to remove multiple lesions on a single occasion  Unless a specific lesion is "inflamed" and is causing pain or stinging/burning or is bleeding, most insurance companies do not authorize treatment  5  NEOPLASM OF UNCERTAIN BEHAVIOR OF SKIN    Assessment and Plan:  I have discussed with the patient that a sample of skin via a "skin biopsy would be potentially helpful to further make a specific diagnosis under the microscope  Based on a thorough discussion of this condition and the management approach to it (including a comprehensive discussion of the known risks, side effects and potential benefits of treatment), the patient (family) agrees to implement the following specific plan:    Procedure:  Skin Biopsy    After a thorough discussion of treatment options and risk/benefits/alternatives (including but not limited to local pain, scarring, dyspigmentation, blistering, possible superinfection, and inability to confirm a diagnosis via histopathology), verbal and written consent were obtained and portion of the rash was biopsied for tissue sample  See below for consent that was obtained from patient and subsequent Procedure Note  PROCEDURE TANGENTIAL (SHAVE) BIOPSY NOTE:    INFORMED CONSENT DISCUSSION AND POST-OPERATIVE INSTRUCTIONS FOR PATIENT    I   RATIONALE FOR PROCEDURE  I understand that a skin biopsy allows the Dermatologist to test a lesion or rash under the microscope to obtain a diagnosis  It usually involves numbing the area with numbing medication and removing a small piece of skin; sometimes the area will be closed with sutures  In this specific procedure, sutures are not usually needed  If any sutures are placed, then they are usually need to be removed in 2 weeks or less  I understand that my Dermatologist recommends that a skin "shave" biopsy be performed today  A local anesthetic, similar to the kind that a dentist uses when filling a cavity, will be injected with a very small needle into the skin area to be sampled  The injected skin and tissue underneath "will go to sleep and become numb so no pain should be felt afterwards  An instrument shaped like a tiny "razor blade" (shave biopsy instrument) will be used to cut a small piece of tissue and skin from the area so that a sample of tissue can be taken and examined more closely under the microscope  A slight amount of bleeding will occur, but it will be stopped with direct pressure and a pressure bandage and any other appropriate methods  I understands that a scar will form where the wound was created  Surgical ointment will be applied to help protect the wound  Sutures are not usually needed      II   RISKS AND POTENTIAL COMPLICATIONS   I understand the risks and potential complications of a skin biopsy include but are not limited to the following:  Bleeding  Infection  Pain  Scar/keloid  Skin discoloration  Incomplete Removal  Recurrence  Nerve Damage/Numbness/Loss of Function  Allergic Reaction to Anesthesia  Biopsies are diagnostic procedures and based on findings additional treatment or evaluation may be required  Loss or destruction of specimen resulting in no additional findings    My Dermatologist has explained to me the nature of the condition, the nature of the procedure, and the benefits to be reasonably expected compared with alternative approaches  My Dermatologist has discussed the likelihood of major risks or complications of this procedure including the specific risks listed above, such as bleeding, infection, and scarring/keloid  I understand that a scar is expected after this procedure  I understand that my physician cannot predict if the scar will form a "keloid," which extends beyond the borders of the wound that is created  A keloid is a thick, painful, and bumpy scar  A keloid can be difficult to treat, as it does not always respond well to therapy, which includes injecting cortisone directly into the keloid every few weeks  While this usually reduces the pain and size of the scar, it does not eliminate it  I understand that photographs may be taken before and after the procedure  These will be maintained as part of the medical providers confidential records and may not be made available to me  I further authorize the medical provider to use the photographs for teaching purposes or to illustrate scientific papers, books, or lectures if in his/her judgment, medical research, education, or science may benefit from its use  I have had an opportunity to fully inquire about the risks and benefits of this procedure and its alternatives  I have been given ample time and opportunity to ask questions and to seek a second opinion if I wished to do so  I acknowledge that there have specifically been no guarantees as to the cosmetic results from the procedure  I am aware that with any procedure there is always the possibility of an unexpected complication  III   POST-PROCEDURAL CARE (WHAT YOU WILL NEED TO DO "AFTER THE BIOPSY" TO OPTIMIZE HEALING)    Keep the area clean and dry  Try NOT to remove the bandage or get it wet for the first 24 hours  Gently clean the area and apply surgical ointment (such as Vaseline petrolatum ointment, which is available "over the counter" and not a prescription) to the biopsy site for up to 2 weeks straight  This acts to protect the wound from the outside world  Sutures are not usually placed in this procedure  If any sutures were placed, return for suture removal as instructed (generally 1 week for the face, 2 weeks for the body)  Take Acetaminophen (Tylenol) for discomfort, if no contraindications  Ibuprofen or aspirin could make bleeding worse  Call our office immediately for signs of infection: fever, chills, increased redness, warmth, tenderness, discomfort/pain, or pus or foul smell coming from the wound  WHAT TO DO IF THERE IS ANY BLEEDING? If a small amount of bleeding is noticed, place a clean cloth over the area and apply firm pressure for ten minutes  Check the wound after 10 minutes of direct pressure  If bleeding persists, try one more time for an additional 10 minutes of direct pressure on the area  If the bleeding becomes heavier or does not stop after the second attempt, or if you have any other questions about this procedure, then please call your [facility]ermatologist by calling [phone] (SKIN)  I hereby acknowledge that I have reviewed and verified the site with my Dermatologist and have requested and authorized my Dermatologist to proceed with the procedure

## 2022-10-18 NOTE — RESULT ENCOUNTER NOTE
DERMATOPATHOLOGY RESULT NOTE    Results reviewed by ordering physician  Called patient to personally discuss results  Discussed results with patient  Instructions for Clinical Derm Team:   (remember to route Result Note to appropriate staff):    None    Result & Plan by Specimen:    Specimen A: benign  Plan: per protocol, moderated atypia with clear margins  Observe and continue to monitor all nevi      Final Diagnosis  A  Skin, right upper buttock, shave biopsy:  Compound melanocytic nevus, with architectural disorder and moderate cytologic atypia   See note

## 2022-10-20 ENCOUNTER — APPOINTMENT (OUTPATIENT)
Dept: URGENT CARE | Facility: CLINIC | Age: 57
End: 2022-10-20

## 2022-10-20 DIAGNOSIS — Z23 NEEDS FLU SHOT: Primary | ICD-10-CM

## 2022-10-27 ENCOUNTER — TELEPHONE (OUTPATIENT)
Dept: GASTROENTEROLOGY | Facility: CLINIC | Age: 57
End: 2022-10-27

## 2022-10-27 NOTE — TELEPHONE ENCOUNTER
[facility] 27 Assessment    Name: Ra White  YOB: 1965  Last Height: 5' 10" (1 778 m)  Last weight: 80 6 kg (177 lb 12 8 oz)  BMI: 25 51 kg/m²  Procedure: flip  Diagnosis: see orders  Date of procedure: 1/11/23  Prep: Golytely   Responsible : yes  Phone#: [phone]  Name completing form: Francisco Javier Guerrero  Date form completed: 10/27/22      If the patient answers yes to any of these questions, schedule in a hospital  Are you pregnant: No  Do you rely on a wheelchair for mobility: No  Have you been diagnosed with End Stage Renal Disease (ESRD): No  Do you need oxygen during the day: No  Have you had a heart attack or stroke within the past three months: No  Have you had a seizure within the past three months: No  Have you ever been informed by anesthesia that you have a difficult airway: No  Additional Questions  Have you had any cardiac testing or are under the care of a Cardiologist (see cardiac list): No  Cardiac list:   Do you have an implanted cardiac defibrillator: No (Comment:  This patient should be scheduled in the hospital)    Have any bleeding problems, such as anemia or hemophilia (If patient has H&H result below 8, schedule in hospital   H&H must be within 30 days of procedure): No    Had an organ transplant within the past 3 months: No    Do you have any present infections: No  Do you get short of breath when walking a few blocks: No  Have you been diagnosed with diabetes: No  Comments (provide cardiac provider information if applicable):

## 2022-11-25 ENCOUNTER — TELEPHONE (OUTPATIENT)
Dept: DERMATOLOGY | Facility: CLINIC | Age: 57
End: 2022-11-25

## 2022-11-25 NOTE — TELEPHONE ENCOUNTER
Called pt and informed him that Dr Villagomez saw his ERNt message and pictures  Informed him that Dr Villagomez would like to schedule an appt to the have the spot biopsied  Asked him to give us a call back when he gets the chance

## 2022-11-26 DIAGNOSIS — K21.9 GASTROESOPHAGEAL REFLUX DISEASE, UNSPECIFIED WHETHER ESOPHAGITIS PRESENT: ICD-10-CM

## 2022-11-26 RX ORDER — PANTOPRAZOLE SODIUM 40 MG/1
TABLET, DELAYED RELEASE ORAL
Qty: 90 TABLET | Refills: 0 | Status: SHIPPED | OUTPATIENT
Start: 2022-11-26

## 2022-12-07 ENCOUNTER — TELEPHONE (OUTPATIENT)
Dept: DERMATOLOGY | Facility: CLINIC | Age: 57
End: 2022-12-07

## 2022-12-07 NOTE — TELEPHONE ENCOUNTER
Pt is calling as he has emergency at work and then [facility] later and is requesting if you would be able to see him either next Wed or Tues as a double book? Your next available in [place] is 1/4  You do have a procedure slot available 12/20 in [facility] ? ??      Please review and advise  Thank you!

## 2022-12-20 ENCOUNTER — PROCEDURE VISIT (OUTPATIENT)
Dept: DERMATOLOGY | Facility: CLINIC | Age: 57
End: 2022-12-20

## 2022-12-20 VITALS — BODY MASS INDEX: 27.35 KG/M2 | HEIGHT: 70 IN | WEIGHT: 191 LBS | TEMPERATURE: 98.5 F

## 2022-12-20 DIAGNOSIS — D48.5 NEOPLASM OF UNCERTAIN BEHAVIOR OF SKIN: Primary | ICD-10-CM

## 2022-12-20 NOTE — PROGRESS NOTES
[facility] Note     Patient Name: Alek Gillespie  Encounter Date: 12/20/2022     Have you been cared for by a [place] [address] Dermatologist in the last 3 years and, if so, which description applies to you? Yes  I have been here within the last 3 years, and my medical history has NOT changed since that time  I am MALE/not capable of bearing children  REVIEW OF SYSTEMS:  Have you recently had or currently have any of the following? · No changes in my recent health  PAST MEDICAL HISTORY:  Have you personally ever had or currently have any of the following? If "YES," then please provide more detail  · No changes in my medical history  FAMILY HISTORY:  Any "first degree relatives" (parent, brother, sister, or child) with the following? • No changes in my family's known health  PATIENT EXPERIENCE:    • Do you want the Dermatologist to perform a COMPLETE skin exam today including a clinical examination under the "bra and underwear" areas? NO  • If necessary, do we have your permission to call and leave a detailed message on your Preferred Phone number that includes your specific medical information? Yes      Allergies   Allergen Reactions   • Diflucan [Fluconazole] Other (See Comments)     Mouth sores      Current Outpatient Medications:   •  ketoconazole (NIZORAL) 2 % shampoo, Daily for 2 weeks straight and then on "Mondays, Wednesdays and Fridays":  Lather into scalp and skin on face, neck, chest, and back; leave on for 5 minutes and then rinse off completely  , Disp: 120 mL, Rfl: 5  •  oxiconazole (Oxistat) 1 % lotion, To affected area  Twice daily, Disp: 60 mL, Rfl: 3  •  pantoprazole (PROTONIX) 40 mg tablet, TAKE 1 TABLET BY MOUTH EVERY DAY, Disp: 90 tablet, Rfl: 0  •  polyethylene glycol (GOLYTELY) 4000 mL solution, Take 4,000 mL by mouth once for 1 dose, Disp: 4000 mL, Rfl: 0  •  sertraline (ZOLOFT) 100 mg tablet, Take 150 mg by mouth daily, Disp: , Rfl:   •  sildenafil (VIAGRA) 50 MG tablet, Take 1 tablet (50 mg total) by mouth daily as needed for erectile dysfunction, Disp: 10 tablet, Rfl: 3  •  escitalopram (LEXAPRO) 10 mg tablet, 1 tablet daily  (Patient not taking: No sig reported), Disp: , Rfl: 3  •  hydroquinone 4 % cream, APPLY TOPICALLY TO NECK TWICE A DAY (Patient not taking: Reported on 10/5/2022), Disp: 28 35 g, Rfl: 0  •  sucralfate (CARAFATE) 1 g tablet, Take 1 tablet (1 g total) by mouth 4 (four) times a day (Patient not taking: Reported on 10/5/2022), Disp: 120 tablet, Rfl: 1          • Whom besides the patient is providing clinical information about today's encounter?   o NO ADDITIONAL HISTORIAN (patient alone provided history)    Physical Exam and Assessment/Plan by Diagnosis:    1  NEOPLASM OF UNCERTAIN BEHAVIOR OF SKIN    Physical Exam:  • (Anatomic Location); (Size and Morphological Description); (Differential Diagnosis):  o Specimen A; Right upper forehead; 0 6 cm crusted verrucous plaque with central pigment; Differential diagnosis; Seborrheic keratosis vs  Atypical pigmented lesion                                                Additional History of Present Condition:  Present for 6 months    Assessment and Plan:  • I have discussed with the patient that a sample of skin via a "skin biopsy” would be potentially helpful to further make a specific diagnosis under the microscope  • Based on a thorough discussion of this condition and the management approach to it (including a comprehensive discussion of the known risks, side effects and potential benefits of treatment), the patient (family) agrees to implement the following specific plan:    o Procedure:  Skin Biopsy    After a thorough discussion of treatment options and risk/benefits/alternatives (including but not limited to local pain, scarring, dyspigmentation, blistering, possible superinfection, and inability to confirm a diagnosis via histopathology), verbal and written consent were obtained and portion of the rash was biopsied for tissue sample  See below for consent that was obtained from patient and subsequent Procedure Note  PROCEDURE TANGENTIAL (SHAVE) BIOPSY NOTE:    • Performing Physician: Dr Villagomez  • Anatomic Location; Clinical Description with size (cm); Pre-Op Diagnosis:   o Specimen A; Right upper forehead; 0 6 cm crusted verrucous plaque with central pigment; Differential diagnosis; Seborrheic keratosis vs  Atypical pigmented lesion  • Post-op diagnosis: Same     • Local anesthesia: 1% xylocaine with epi      • Topical anesthesia: None    • Hemostasis: Aluminum chloride       After obtaining informed consent  at which time there was a discussion about the purpose of biopsy  and low risks of infection and bleeding  The area was prepped and draped in the usual fashion  Anesthesia was obtained with 1% lidocaine with epinephrine  A shave biopsy to an appropriate sampling depth was obtained by Shave (Dermablade or 15 blade) The resulting wound was covered with surgical ointment and bandaged appropriately  The patient tolerated the procedure well without complications and was without signs of functional compromise  Specimen has been sent for review by Dermatopathology  Standard post-procedure care has been explained and has been included in written form within the patient's copy of Informed Consent  INFORMED CONSENT DISCUSSION AND POST-OPERATIVE INSTRUCTIONS FOR PATIENT    I   RATIONALE FOR PROCEDURE  I understand that a skin biopsy allows the Dermatologist to test a lesion or rash under the microscope to obtain a diagnosis  It usually involves numbing the area with numbing medication and removing a small piece of skin; sometimes the area will be closed with sutures  In this specific procedure, sutures are not usually needed  If any sutures are placed, then they are usually need to be removed in 2 weeks or less      I understand that my Dermatologist recommends that a skin "shave" biopsy be performed today   A local anesthetic, similar to the kind that a dentist uses when filling a cavity, will be injected with a very small needle into the skin area to be sampled  The injected skin and tissue underneath "will go to sleep” and become numb so no pain should be felt afterwards  An instrument shaped like a tiny "razor blade" (shave biopsy instrument) will be used to cut a small piece of tissue and skin from the area so that a sample of tissue can be taken and examined more closely under the microscope  A slight amount of bleeding will occur, but it will be stopped with direct pressure and a pressure bandage and any other appropriate methods  I understands that a scar will form where the wound was created  Surgical ointment will be applied to help protect the wound  Sutures are not usually needed  II   RISKS AND POTENTIAL COMPLICATIONS   I understand the risks and potential complications of a skin biopsy include but are not limited to the following:  • Bleeding  • Infection  • Pain  • Scar/keloid  • Skin discoloration  • Incomplete Removal  • Recurrence  • Nerve Damage/Numbness/Loss of Function  • Allergic Reaction to Anesthesia  • Biopsies are diagnostic procedures and based on findings additional treatment or evaluation may be required  • Loss or destruction of specimen resulting in no additional findings    My Dermatologist has explained to me the nature of the condition, the nature of the procedure, and the benefits to be reasonably expected compared with alternative approaches  My Dermatologist has discussed the likelihood of major risks or complications of this procedure including the specific risks listed above, such as bleeding, infection, and scarring/keloid  I understand that a scar is expected after this procedure  I understand that my physician cannot predict if the scar will form a "keloid," which extends beyond the borders of the wound that is created    A keloid is a thick, painful, and bumpy scar  A keloid can be difficult to treat, as it does not always respond well to therapy, which includes injecting cortisone directly into the keloid every few weeks  While this usually reduces the pain and size of the scar, it does not eliminate it  I understand that photographs may be taken before and after the procedure  These will be maintained as part of the medical providers confidential records and may not be made available to me  I further authorize the medical provider to use the photographs for teaching purposes or to illustrate scientific papers, books, or lectures if in his/her judgment, medical research, education, or science may benefit from its use  I have had an opportunity to fully inquire about the risks and benefits of this procedure and its alternatives  I have been given ample time and opportunity to ask questions and to seek a second opinion if I wished to do so  I acknowledge that there have specifically been no guarantees as to the cosmetic results from the procedure  I am aware that with any procedure there is always the possibility of an unexpected complication  III  POST-PROCEDURAL CARE (WHAT YOU WILL NEED TO DO "AFTER THE BIOPSY" TO OPTIMIZE HEALING)    • Keep the area clean and dry  Try NOT to remove the bandage or get it wet for the first 24 hours  • Gently clean the area and apply surgical ointment (such as Vaseline petrolatum ointment, which is available "over the counter" and not a prescription) to the biopsy site for up to 2 weeks straight  This acts to protect the wound from the outside world  • Sutures are not usually placed in this procedure  If any sutures were placed, return for suture removal as instructed (generally 1 week for the face, 2 weeks for the body)  • Take Acetaminophen (Tylenol) for discomfort, if no contraindications  Ibuprofen or aspirin could make bleeding worse      • Call our office immediately for signs of infection: fever, chills, increased redness, warmth, tenderness, discomfort/pain, or pus or foul smell coming from the wound  WHAT TO DO IF THERE IS ANY BLEEDING? If a small amount of bleeding is noticed, place a clean cloth over the area and apply firm pressure for ten minutes  Check the wound after 10 minutes of direct pressure  If bleeding persists, try one more time for an additional 10 minutes of direct pressure on the area  If the bleeding becomes heavier or does not stop after the second attempt, or if you have any other questions about this procedure, then please call your [address] [place] [address]'s Dermatologist by calling [phone] (SKIN)  I hereby acknowledge that I have reviewed and verified the site with my Dermatologist and have requested and authorized my Dermatologist to proceed with the procedure          Scribe Attestation    I,:  Ledy Petty MA am acting as a scribe while in the presence of the attending physician :       I,:  Cary Sharp MD personally performed the services described in this documentation    as scribed in my presence :

## 2022-12-20 NOTE — PATIENT INSTRUCTIONS
III  POST-PROCEDURAL CARE (WHAT YOU WILL NEED TO DO "AFTER THE BIOPSY" TO OPTIMIZE HEALING)    Keep the area clean and dry  Try NOT to remove the bandage or get it wet for the first 24 hours  Gently clean the area and apply surgical ointment (such as Vaseline petrolatum ointment, which is available "over the counter" and not a prescription) to the biopsy site for up to 2 weeks straight  This acts to protect the wound from the outside world  Sutures are not usually placed in this procedure  If any sutures were placed, return for suture removal as instructed (generally 1 week for the face, 2 weeks for the body)  Take Acetaminophen (Tylenol) for discomfort, if no contraindications  Ibuprofen or aspirin could make bleeding worse  Call our office immediately for signs of infection: fever, chills, increased redness, warmth, tenderness, discomfort/pain, or pus or foul smell coming from the wound  WHAT TO DO IF THERE IS ANY BLEEDING? If a small amount of bleeding is noticed, place a clean cloth over the area and apply firm pressure for ten minutes  Check the wound after 10 minutes of direct pressure  If bleeding persists, try one more time for an additional 10 minutes of direct pressure on the area  If the bleeding becomes heavier or does not stop after the second attempt, or if you have any other questions about this procedure, then please call your [facility] Dermatologist by calling [phone] (SKIN)  I hereby acknowledge that I have reviewed and verified the site with my Dermatologist and have requested and authorized my Dermatologist to proceed with the procedure

## 2022-12-28 ENCOUNTER — TELEPHONE (OUTPATIENT)
Dept: GASTROENTEROLOGY | Facility: CLINIC | Age: 57
End: 2022-12-28

## 2022-12-29 NOTE — RESULT ENCOUNTER NOTE
DERMATOPATHOLOGY RESULT NOTE    Results reviewed by ordering physician  Called patient to personally discuss results  Discussed results with patient  Instructions for Clinical Derm Team:   (remember to route Result Note to appropriate staff):    None    Result & Plan by Specimen:    Specimen A: benign  Plan: reassured, benign    Final Diagnosis  A  Skin lesion, A  Right upper forehead, shave biopsy:  - Seborrheic keratosis, pigmented, inflamed and hyperkeratotic          Electronically signed by Yadiel Munoz MD      [facility]

## 2023-02-09 ENCOUNTER — TELEPHONE (OUTPATIENT)
Dept: GASTROENTEROLOGY | Facility: CLINIC | Age: 58
End: 2023-02-09

## 2023-02-09 NOTE — TELEPHONE ENCOUNTER
Spoke with patient reminding him of his 2/22 egd/colon with Dr Preethi Chisholm at [facility]  He has a , has prep and instructions and will be called day prior with arrival time  Redid [facility] form with patient

## 2023-02-09 NOTE — TELEPHONE ENCOUNTER
[facility] Assessment    Name: Osbaldo Ferguson  YOB: 1965  Last Height: 5' 10" (1 778 m)  Last weight: 86 6 kg (191 lb)  BMI: 27 41 kg/m²  Procedure: Colon and Egd  Diagnosis:   Date of procedure: 02/22/23  Prep: golytely  Responsible : yes  Phone#:   Name completing form: Jenn Davalos  Date form completed: 02/09/23      If the patient answers yes to any of these questions, schedule in a hospital  Are you pregnant: No  Do you rely on a wheelchair for mobility: No  Have you been diagnosed with End Stage Renal Disease (ESRD): No  Do you need oxygen during the day: No  Have you had a heart attack or stroke within the past three months: No  Have you had a seizure within the past three months: No  Have you ever been informed by anesthesia that you have a difficult airway: No  Additional Questions  Have you had any cardiac testing or are under the care of a Cardiologist (see cardiac list): No  Cardiac list:   Do you have an implanted cardiac defibrillator: No (Comment:  This patient should be scheduled in the hospital)    Have any bleeding problems, such as anemia or hemophilia (If patient has H&H result below 8, schedule in hospital   H&H must be within 30 days of procedure): No    Had an organ transplant within the past 3 months: No    Do you have any present infections: No  Do you get short of breath when walking a few blocks: No  Have you been diagnosed with diabetes: No  Comments (provide cardiac provider information if applicable):

## 2023-03-15 DIAGNOSIS — K21.9 GASTROESOPHAGEAL REFLUX DISEASE, UNSPECIFIED WHETHER ESOPHAGITIS PRESENT: ICD-10-CM

## 2023-03-16 DIAGNOSIS — K21.9 GASTROESOPHAGEAL REFLUX DISEASE, UNSPECIFIED WHETHER ESOPHAGITIS PRESENT: Primary | ICD-10-CM

## 2023-03-16 RX ORDER — PANTOPRAZOLE SODIUM 40 MG/1
40 TABLET, DELAYED RELEASE ORAL DAILY
Qty: 60 TABLET | Refills: 0 | Status: SHIPPED | OUTPATIENT
Start: 2023-03-16

## 2023-03-17 NOTE — TELEPHONE ENCOUNTER
Left message for patient to call and reschedule his colonoscopy/egd that was cancelled last month  He does have an ov in June for f/u ov to meds  Will call again in 1 wk if he doesn't return call

## 2023-03-27 RX ORDER — PANTOPRAZOLE SODIUM 40 MG/1
40 TABLET, DELAYED RELEASE ORAL DAILY
Qty: 90 TABLET | Refills: 0 | OUTPATIENT
Start: 2023-03-27

## 2023-05-23 ENCOUNTER — TELEPHONE (OUTPATIENT)
Dept: GASTROENTEROLOGY | Facility: AMBULARY SURGERY CENTER | Age: 58
End: 2023-05-23

## 2023-05-23 DIAGNOSIS — B36.0 TINEA VERSICOLOR: ICD-10-CM

## 2023-05-23 DIAGNOSIS — K21.9 GASTROESOPHAGEAL REFLUX DISEASE, UNSPECIFIED WHETHER ESOPHAGITIS PRESENT: ICD-10-CM

## 2023-05-23 RX ORDER — OXICONAZOLE NITRATE 10 MG/ML
LOTION TOPICAL
Qty: 60 ML | Refills: 3 | Status: SHIPPED | OUTPATIENT
Start: 2023-05-23

## 2023-05-23 RX ORDER — PANTOPRAZOLE SODIUM 40 MG/1
40 TABLET, DELAYED RELEASE ORAL DAILY
Qty: 60 TABLET | Refills: 0 | Status: SHIPPED | OUTPATIENT
Start: 2023-05-23

## 2023-05-23 NOTE — TELEPHONE ENCOUNTER
Patients GI provider:  Dr Alba Rojo    Number to return call: [phone]     Reason for call: Pt calling requesting to speak with someone regarding a refill of protonix  Patient stated he has an appt scheduled 6- but running out of his medication   Would like to be called     Scheduled procedure/appointment date if applicable: Apt/procedure 6-

## 2023-06-02 DIAGNOSIS — N52.9 ERECTILE DYSFUNCTION, UNSPECIFIED ERECTILE DYSFUNCTION TYPE: ICD-10-CM

## 2023-06-02 RX ORDER — SILDENAFIL 50 MG/1
50 TABLET, FILM COATED ORAL DAILY PRN
Qty: 10 TABLET | Refills: 0 | Status: SHIPPED | OUTPATIENT
Start: 2023-06-02

## 2023-06-06 ENCOUNTER — TELEPHONE (OUTPATIENT)
Dept: DERMATOLOGY | Facility: CLINIC | Age: 58
End: 2023-06-06

## 2023-06-06 NOTE — TELEPHONE ENCOUNTER
Returned call from patient needing to reschedule upcoming appt with Dr Dom Gallegos - patient will be on vacation and unavailable  R/S to Danelle Aschoff office  Patient aware and in agreement to appt date/time/location

## 2023-06-28 ENCOUNTER — OFFICE VISIT (OUTPATIENT)
Dept: GASTROENTEROLOGY | Facility: CLINIC | Age: 58
End: 2023-06-28
Payer: COMMERCIAL

## 2023-06-28 ENCOUNTER — TELEPHONE (OUTPATIENT)
Dept: GASTROENTEROLOGY | Facility: CLINIC | Age: 58
End: 2023-06-28

## 2023-06-28 VITALS
BODY MASS INDEX: 27.77 KG/M2 | DIASTOLIC BLOOD PRESSURE: 83 MMHG | HEIGHT: 70 IN | HEART RATE: 64 BPM | WEIGHT: 194 LBS | SYSTOLIC BLOOD PRESSURE: 145 MMHG

## 2023-06-28 DIAGNOSIS — Z12.11 COLON CANCER SCREENING: ICD-10-CM

## 2023-06-28 DIAGNOSIS — K21.9 GASTROESOPHAGEAL REFLUX DISEASE, UNSPECIFIED WHETHER ESOPHAGITIS PRESENT: Primary | ICD-10-CM

## 2023-06-28 DIAGNOSIS — R35.1 NOCTURIA: ICD-10-CM

## 2023-06-28 PROCEDURE — 99204 OFFICE O/P NEW MOD 45 MIN: CPT | Performed by: INTERNAL MEDICINE

## 2023-06-28 NOTE — PROGRESS NOTES
[facility] - Outpatient Consultation  Bernardino Alicia 62 y.o. male MRN: [id number]  Encounter: [id number]          ASSESSMENT AND PLAN:      1. Gastroesophageal reflux disease, unspecified whether esophagitis present  Patient has longstanding history of gastroesophageal reflux disease. Currently he is taking pantoprazole which she has been taking for many years. He has history of reflux. There was a history of intestinal metaplasia of the EG junction. He does not have confirmed Hutton's esophagus. Intestinal metaplasia has been noted focally few centimeters above the esophagus and him. Upper endoscopy will be scheduled for evaluation recommendations. - CBC and differential; Future  - Comprehensive metabolic panel; Future    2. Colon cancer screening  Patient has history of adenomatous colon polyp. His last colonoscopy was 6 years ago. A colonoscopy will be scheduled for surveillance. He is asymptomatic from GI perspective. He does not have any diarrhea, constipation or rectal bleeding.    - CBC and differential; Future  - Comprehensive metabolic panel; Future    3. Nocturia  Patient has history of nocturia. And follow with primary care. - PSA, total and free; Future    ______________________________________________________________________    HPI: Patient is a little asymptomatic from GI perspective. He has nocturia. Have advised to follow with primary care. He will get a upper endoscopy and colonoscopy. REVIEW OF SYSTEMS:    CONSTITUTIONAL: Denies any fever, chills, rigors, and weight loss. HEENT: No earache or tinnitus. Denies hearing loss or visual disturbances. CARDIOVASCULAR: No chest pain or palpitations. RESPIRATORY: Denies any cough, hemoptysis, shortness of breath or dyspnea on exertion. GASTROINTESTINAL: As noted in the History of Present Illness. GENITOURINARY: No problems with urination. Denies any hematuria or dysuria.   NEUROLOGIC: No dizziness or vertigo, denies headaches. MUSCULOSKELETAL: Denies any muscle or joint pain. SKIN: Denies skin rashes or itching. ENDOCRINE: Denies excessive thirst. Denies intolerance to heat or cold. PSYCHOSOCIAL: Denies depression or anxiety. Denies any recent memory loss. Historical Information   Past Medical History:   Diagnosis Date   • Hutton esophagus 1996   • Melanoma ([address]) 10 years ago    Back   • Melanoma ([address]) 10 years ago    Neck   • Melanoma ([address]) 15 years plus     calf    • Skin cancer 2021    Basil cell     Past Surgical History:   Procedure Laterality Date   • BACK SURGERY     • SKIN BIOPSY     • SKIN CANCER EXCISION      back neck calf    • TONSILLECTOMY     • UVULECTOMY       Social History   Social History     Substance and Sexual Activity   Alcohol Use Yes   • Alcohol/week: 4.0 standard drinks of alcohol   • Types: 4 Standard drinks or equivalent per week     Social History     Substance and Sexual Activity   Drug Use No     Social History     Tobacco Use   Smoking Status Never   Smokeless Tobacco Never     Family History   Problem Relation Age of Onset   • Melanoma Maternal Grandmother        Meds/Allergies       Current Outpatient Medications:   •  ketoconazole (NIZORAL) 2 % shampoo  •  oxiconazole (Oxistat) 1 % lotion  •  pantoprazole (PROTONIX) 40 mg tablet  •  polyethylene glycol (GOLYTELY) 4000 mL solution  •  sertraline (ZOLOFT) 100 mg tablet  •  sildenafil (VIAGRA) 50 MG tablet  •  escitalopram (LEXAPRO) 10 mg tablet  •  hydroquinone 4 % cream  •  pantoprazole (PROTONIX) 40 mg tablet  •  sucralfate (CARAFATE) 1 g tablet    Allergies   Allergen Reactions   • Diflucan [Fluconazole] Other (See Comments)     Mouth sores           Objective     Blood pressure 145/83, pulse 64, height 5' 10" (1.778 m), weight 88 kg (194 lb). Body mass index is 27.84 kg/m².         PHYSICAL EXAM:      General Appearance:   Alert, cooperative, no distress   HEENT:   Normocephalic, atraumatic, anicteric.     Neck:  Supple, symmetrical, trachea midline   Lungs:   Clear to auscultation bilaterally; no rales, rhonchi or wheezing; respirations unlabored    Heart[de-identified]   Regular rate and rhythm; no murmur, rub, or gallop. Abdomen:   Soft, non-tender, non-distended; normal bowel sounds; no masses, no organomegaly    Genitalia:   Deferred    Rectal:   Deferred    Extremities:  No cyanosis, clubbing or edema    Pulses:  2+ and symmetric    Skin:  No jaundice, rashes, or lesions    Lymph nodes:  No palpable cervical lymphadenopathy        Lab Results:   No visits with results within 1 Day(s) from this visit. Latest known visit with results is:   Procedure visit on 12/20/2022   Component Date Value   • Case Report 12/20/2022                      Value:Surgical Pathology Report                         Case: [id number]                                   Authorizing Provider:  Aurora Villalba MD          Collected:           12/20/2022 1635              Ordering Location:     [facility]      Received:            12/20/2022 [address] [place] [address]                                                                Pathologist:           Alpesh Christensen MD                                                         Specimen:    Skin, Other, A. Right upper forehead                                                      • Final Diagnosis 12/20/2022                      Value: This result contains rich text formatting which cannot be displayed here. • Additional Information 12/20/2022                      Value: This result contains rich text formatting which cannot be displayed here. • Gross Description 12/20/2022                      Value: This result contains rich text formatting which cannot be displayed here. • Clinical Information 12/20/2022                      Value:Specimen A; Right upper forehead; Skin;  Shave biopsy; 64year old male with a0.6 cm crusted verrucous plaque with central pigment; Differential diagnosis; Seborrheic keratosis vs. Atypical pigmented lesion    Attention: dermpath         Radiology Results:   No results found.

## 2023-06-28 NOTE — TELEPHONE ENCOUNTER
Scheduled date of EGD/colonoscopy (as of today):8/14/23  Physician performing EGD/colonoscopy:Piedad  Location of EGD/colonoscopy: [facility]  Desired bowel prep reviewed with patient:Torey  Instructions reviewed with patient by:Jessa NIEVES  Clearances:  None

## 2023-06-30 ENCOUNTER — TELEPHONE (OUTPATIENT)
Age: 58
End: 2023-06-30

## 2023-06-30 NOTE — TELEPHONE ENCOUNTER
Patient calling to reschedule his colonoscopy and EGD   Procedures have been rescheduled for 9/25/23 with Dr Quinton Garza at [facility]

## 2023-07-15 DIAGNOSIS — K21.9 GASTROESOPHAGEAL REFLUX DISEASE, UNSPECIFIED WHETHER ESOPHAGITIS PRESENT: ICD-10-CM

## 2023-07-17 RX ORDER — PANTOPRAZOLE SODIUM 40 MG/1
40 TABLET, DELAYED RELEASE ORAL DAILY
Qty: 90 TABLET | Refills: 2 | Status: SHIPPED | OUTPATIENT
Start: 2023-07-17

## 2023-09-20 ENCOUNTER — TELEPHONE (OUTPATIENT)
Age: 58
End: 2023-09-20

## 2023-09-20 ENCOUNTER — PREP FOR PROCEDURE (OUTPATIENT)
Age: 58
End: 2023-09-20

## 2023-09-20 DIAGNOSIS — K22.719 BARRETT'S ESOPHAGUS WITH DYSPLASIA: Primary | ICD-10-CM

## 2023-09-20 DIAGNOSIS — Z86.010 HISTORY OF COLON POLYPS: ICD-10-CM

## 2023-09-20 DIAGNOSIS — K21.00 GASTROESOPHAGEAL REFLUX DISEASE WITH ESOPHAGITIS WITHOUT HEMORRHAGE: ICD-10-CM

## 2023-09-20 DIAGNOSIS — K44.9 HIATAL HERNIA: ICD-10-CM

## 2023-10-19 ENCOUNTER — ANESTHESIA (OUTPATIENT)
Dept: ANESTHESIOLOGY | Facility: AMBULATORY SURGERY CENTER | Age: 58
End: 2023-10-19

## 2023-10-19 ENCOUNTER — ANESTHESIA EVENT (OUTPATIENT)
Dept: ANESTHESIOLOGY | Facility: AMBULATORY SURGERY CENTER | Age: 58
End: 2023-10-19

## 2024-01-04 ENCOUNTER — PATIENT MESSAGE (OUTPATIENT)
Dept: DERMATOLOGY | Facility: CLINIC | Age: 59
End: 2024-01-04

## 2024-01-08 NOTE — TELEPHONE ENCOUNTER
Geoff Desai, I went into patient chart and noticed he has two appts now for the same day on 16th, do you know what time he wants to come for? One is for 8 am and other is for 10:50 am both with Dr. Villagomez? thanks

## 2024-01-09 NOTE — PATIENT COMMUNICATION
I called patient and confirmed appointment time to make sure.     He called earlier today and fixed it as well after seeing [employer] message. So he is all set. For 1/16/2024 @ 10:50 am in [facility] with Dr. Villagomez.     Thanks Monika.

## 2024-01-16 ENCOUNTER — OFFICE VISIT (OUTPATIENT)
Dept: DERMATOLOGY | Facility: CLINIC | Age: 59
End: 2024-01-16
Payer: COMMERCIAL

## 2024-01-16 VITALS — HEIGHT: 70 IN | BODY MASS INDEX: 27.77 KG/M2 | WEIGHT: 194 LBS

## 2024-01-16 DIAGNOSIS — D22.72 MULTIPLE BENIGN MELANOCYTIC NEVI OF BOTH UPPER EXTREMITIES, BOTH LOWER EXTREMITIES, AND TRUNK: ICD-10-CM

## 2024-01-16 DIAGNOSIS — D22.62 MULTIPLE BENIGN MELANOCYTIC NEVI OF BOTH UPPER EXTREMITIES, BOTH LOWER EXTREMITIES, AND TRUNK: ICD-10-CM

## 2024-01-16 DIAGNOSIS — D22.5 MULTIPLE BENIGN MELANOCYTIC NEVI OF BOTH UPPER EXTREMITIES, BOTH LOWER EXTREMITIES, AND TRUNK: ICD-10-CM

## 2024-01-16 DIAGNOSIS — D22.61 MULTIPLE BENIGN MELANOCYTIC NEVI OF BOTH UPPER EXTREMITIES, BOTH LOWER EXTREMITIES, AND TRUNK: ICD-10-CM

## 2024-01-16 DIAGNOSIS — L81.4 LENTIGO: ICD-10-CM

## 2024-01-16 DIAGNOSIS — B36.0 TINEA VERSICOLOR: ICD-10-CM

## 2024-01-16 DIAGNOSIS — D22.71 MULTIPLE BENIGN MELANOCYTIC NEVI OF BOTH UPPER EXTREMITIES, BOTH LOWER EXTREMITIES, AND TRUNK: ICD-10-CM

## 2024-01-16 DIAGNOSIS — Z85.828 HISTORY OF BASAL CELL CANCER: Primary | ICD-10-CM

## 2024-01-16 DIAGNOSIS — Z85.820 HISTORY OF MELANOMA: ICD-10-CM

## 2024-01-16 DIAGNOSIS — D18.01 CHERRY ANGIOMA: ICD-10-CM

## 2024-01-16 DIAGNOSIS — L82.1 SEBORRHEIC KERATOSIS: ICD-10-CM

## 2024-01-16 PROCEDURE — 99214 OFFICE O/P EST MOD 30 MIN: CPT | Performed by: DERMATOLOGY

## 2024-01-16 RX ORDER — OXICONAZOLE NITRATE 10 MG/ML
LOTION TOPICAL
Qty: 60 ML | Refills: 12 | Status: SHIPPED | OUTPATIENT
Start: 2024-01-16

## 2024-01-16 RX ORDER — KETOCONAZOLE 20 MG/ML
SHAMPOO TOPICAL
Qty: 120 ML | Refills: 5 | Status: SHIPPED | OUTPATIENT
Start: 2024-01-16

## 2024-01-16 NOTE — PROGRESS NOTES
"[facility] Dermatology Clinic Note     Patient Name: Inessa Vazquez  Encounter Date: 1/16/24     Have you been cared for by a [facility] Dermatologist in the last 3 years and, if so, which description applies to you?    Yes.  I have been here within the last 3 years, and my medical history has NOT changed since that time.  I am MALE/not capable of bearing children.    REVIEW OF SYSTEMS:  Have you recently had or currently have any of the following? No changes in my recent health.   PAST MEDICAL HISTORY:  Have you personally ever had or currently have any of the following?  If \"YES,\" then please provide more detail. No changes in my medical history.   HISTORY OF IMMUNOSUPPRESSION: Do you have a history of any of the following:  Systemic Immunosuppression such as Diabetes, Biologic or Immunotherapy, Chemotherapy, Organ Transplantation, Bone Marrow Transplantation?  No     Answering \"YES\" requires the addition of the dotphrase \"IMMUNOSUPPRESSED\" as the first diagnosis of the patient's visit.   FAMILY HISTORY:  Any \"first degree relatives\" (parent, brother, sister, or child) with the following?    No changes in my family's known health.   PATIENT EXPERIENCE:    Do you want the Dermatologist to perform a COMPLETE skin exam today including a clinical examination under the \"bra and underwear\" areas?  Yes  If necessary, do we have your permission to call and leave a detailed message on your Preferred Phone number that includes your specific medical information?  Yes      Allergies   Allergen Reactions   • Diflucan [Fluconazole] Other (See Comments)     Mouth sores      Current Outpatient Medications:   •  ketoconazole (NIZORAL) 2 % shampoo, Daily for 2 weeks straight and then on \"Mondays, Wednesdays and Fridays\":  Lather into scalp and skin on face, neck, chest, and back; leave on for 5 minutes and then rinse off completely., Disp: 120 mL, Rfl: 5  •  oxiconazole (Oxistat) 1 % lotion, To affected area  Twice daily, Disp: 60 " mL, Rfl: 12  •  pantoprazole (PROTONIX) 40 mg tablet, Take 1 tablet (40 mg total) by mouth daily 1 tablet by mouth daily, Disp: 90 tablet, Rfl: 2  •  sertraline (ZOLOFT) 100 mg tablet, Take 150 mg by mouth daily, Disp: , Rfl:   •  sildenafil (VIAGRA) 50 MG tablet, Take 1 tablet (50 mg total) by mouth daily as needed for erectile dysfunction (Patient not taking: Reported on 1/16/2024), Disp: 10 tablet, Rfl: 0  •  sucralfate (CARAFATE) 1 g tablet, Take 1 tablet (1 g total) by mouth 4 (four) times a day (Patient not taking: Reported on 10/5/2022), Disp: 120 tablet, Rfl: 1          Whom besides the patient is providing clinical information about today's encounter?   NO ADDITIONAL HISTORIAN (patient alone provided history)    Physical Exam and Assessment/Plan by Diagnosis:   HISTORY OF MELANOMA     Physical Exam:  Anatomic Location Affected:  Neck, Back, Calf  Morphological Description of Scar:  Well healed scar  Year Treated: 10-15 years ago  Suspected Recurrence: no  Regional adenopathy: no     Additional History of Present Condition:  Patient status post Melanoma of neck, back and calf.     Assessment and Plan:  Based on a thorough discussion of this condition and the management approach to it (including a comprehensive discussion of the known risks, side effects and potential benefits of treatment), the patient (family) agrees to implement the following specific plan:  Continue skin exams with Dermatology.  Apply SPF 50 or higher multiple times a day.  Wear sun protecting clothing and hats.  Monitor skin for any changes.  Future physician- Dr Valiente, Dr Balderas or Dr Tidwell     What happens at follow-up?  The main purpose of follow-up is to detect recurrences early (metastatic melanoma), but it also offers an opportunity to diagnose a new primary melanoma at the first possible opportunity. A second invasive melanoma occurs in 5-10% of melanoma patients and a new melanoma in situ is diagnosed in more than 20% of  "melanoma patients.     Our practice makes the following recommendations for follow-up for patients with invasive melanoma.  At-least \"monthly\" self-skin examinations   Routine skin checks by a board certified dermatologist  Follow-up intervals are \"every 3 months\" within 2 years of a new melanoma diagnosis; \"every 6 months\" between 2-4 years of a new melanoma diagnosis; and \"annually\" after 4 years of a new melanoma diagnosis  Individual patient's needs should be considered before an appropriate follow-up is offered  Provide education and support to help the patient adjust to their illness     Follow-up appointments should include:  A check of the scar where the primary melanoma was removed  Checking the regional lymph nodes  A general skin examination  A full physical examination at least annually by your primary care physician     In those with more advanced primary disease, follow-up may include:  Blood tests  Imaging: ultrasound, X-ray, CT, MRI and PET scan.     Most tests are not worthwhile for patients with stage 1 or 2 melanoma unless there are signs or symptoms of disease recurrence or metastasis. No tests are necessary for healthy patients who have remained well for five years or longer after removal of their melanoma.     What is the outlook for patients with melanoma?  Melanoma in situ is cured by excision because it has no potential to spread around the body.  The risk of spread and ultimate death from invasive melanoma depends on several factors, but the main one is the Breslow thickness of the melanoma at the time it was surgically removed.  Metastases are rare for melanomas < 0.75 mm and the risk for tumours 0.75-1 mm thick is about 5%. The risk steadily increases with thickness so that melanomas > 4 mm have a risk of metastasis of about 40%.     Melanoma is a potentially serious type of skin cancer, in which there is uncontrolled growth of melanocytes (pigment cells). Melanoma is sometimes called " malignant melanoma.  Normal melanocytes are found in the basal layer of the epidermis (the outer layer of skin). Melanocytes produce a protein called melanin, which protects skin cells by absorbing ultraviolet (UV) radiation. Melanocytes are found in equal numbers in black and white skin, but melanocytes in black skin produce much more melanin. People with dark brown or black skin are very much less likely to be damaged by UV radiation than those with white skin.    HISTORY OF BASAL CELL CARCINOMA     Physical Exam:  Anatomic Location Affected:  Right Shoulder   Morphological Description of scar:  Well healed scar   Suspected Recurrence: No  Pertinent Positives:  Pertinent Negatives:        Additional History of Present Condition:  History of basal cell carcinoma with no sign of recurrence     Assessment and Plan:  Based on a thorough discussion of this condition and the management approach to it (including a comprehensive discussion of the known risks, side effects and potential benefits of treatment), the patient (family) agrees to implement the following specific plan:  No sign of recurrence   Continue skin examinations      How can basal cell carcinoma be prevented?  The most important way to prevent BCC is to avoid sunburn. This is especially important in childhood and early life. Fair skinned individuals and those with a personal or family history of BCC should protect their skin from sun exposure daily, year-round and lifelong.  Stay indoors or under the shade in the middle of the day   Wear covering clothing   Apply high protection factor SPF50+ broad-spectrum sunscreens generously to exposed skin if outdoors   Avoid indoor tanning (sun beds, solaria)  Oral nicotinamide (vitamin B3) in a dose of 500 mg twice daily may reduce the number and severity of BCCs.     What is the outlook for basal cell carcinoma?  Most BCCs are cured by treatment. Cure is most likely if treatment is undertaken when the lesion is  small.  About 50% of people with BCC develop a second one within 3 years of the first. They are also at increased risk of other skin cancers, especially melanoma. Regular self-skin examinations and long-term annual skin checks by an experienced health professional are recommended.  SEBORRHEIC KERATOSES  - Relevant exam: Scattered over the trunk/extremities are waxy brown to black plaques and papules with stuck on appearance  - Exam and clinical history consistent with seborrheic keratoses  - Counseled that these are benign growths that do not require treatment  - Counseled that removal of lesions is considered cosmetic and so would incur a fee should patient elect to move forward.   - Patient to hold on treatments for now but will inform us should they desire additional treatments    MELANOCYTIC NEVI  -Relevant exam: Scattered over the trunk/extremities are homogenously pigmented brown macules and papules. ELM performed and without concerning findings.  - Exam and clinical history consistent with melanocytic nevi  - Educated on the ABCDE's of melanoma; handout provided  - Counseled to return to clinic prior to scheduled appointment should any of these lesions change or should any new lesions of concern arise  - Counseled on use of sun protection daily. Reviewed latest FDA sunscreen guidelines, including use of broad spectrum (UVA and UVB blocking) sunscreen or sun protective clothing with SPF 30-50 every 2-3 hours and reapplied after exposure to water; use of photoprotective clothing, including a broad brim hat and UPF rated clothing if outdoors for several hours; avoid use of tanning beds as these pose significant risk for melanoma and skin cancer.    LENTIGINES  OTHER SKIN CHANGES DUE TO CHRONIC EXPOSURE TO NONIONIZING RADIATION  - Relevant exam: Over sun exposed areas are brown macules. ELM performed and without concerning findings.  - Exam and clinical history consistent with lentigines.  - Educated that these  "are indicative of prior sun exposure.   - Counseled to return to clinic prior to scheduled appointment should any of these lesions change or should any new lesions of concern arise.  - Recommended use of sunscreen as above and below.  - Counseled on use of sun protection daily. Reviewed latest FDA sunscreen guidelines, including use of broad spectrum (UVA and UVB blocking) sunscreen or sun protective clothing with SPF 30-50 every 2-3 hours and reapplied after exposure to water; use of photoprotective clothing, including a broad brim hat and UPF rated clothing if outdoors for several hours; avoid use of tanning beds as these pose significant risk for melanoma and skin cancer.    CHERRY ANGIOMAS  - Relevant exam: Scattered over the trunk/extremities are red papules  - Exam and clinical history consistent with cherry angiomas  - Educated that these are benign  - Educated that removal is considered aesthetic and would incur a fee.  - Patient does not wish to pursue removal at this time but will contact us should this change.      TINEA VERSICOLOR     Physical Exam:  Anatomic Location Affected:  Scattered   Morphological Description:  Hypopigmented scaling macules upper chest and back.  Pertinent Positives:  Pertinent Negatives:     Additional History of Present Condition:  Patient has tried Oxistat 1% lotion.      Assessment and Plan:  Based on a thorough discussion of this condition and the management approach to it (including a comprehensive discussion of the known risks, side effects and potential benefits of treatment), the patient (family) agrees to implement the following specific plan:  Can use Ketoconazole Shampoo 2%- Daily for 2 weeks straight and then on \"Mondays, Wednesdays and Fridays\":  Lather into scalp and skin on face, neck, chest, and back; leave on for 5 minutes and then rinse off completely.  Continue Oxistat 1% lotion     What is tinea versicolor?  Tinea versicolor or pityriasis versicolor is a type " of fungal infection on the skin due to a yeast that lives on all of us. It Is due an overgrowth of a type of yeast called Malassezia furfur, which feeds on oils in the skin and thrives in warm, humid environments. Anyone can develop tinea versicolor, but it is more common during the summer months and in tropical climates. Those who tend to sweat more heavily are also at higher risk. Although it is not considered infectious, multiple family members can be affected.   Teens and young adults are most susceptible due to having oily skin   Affects people of all skin colors   Weakened immune system predisposes to development      What are the clinical symptoms of tinea versicolor?   The first sign of tinea versicolor is often spots on the skin. They can be lighter or darker than surrounding skin, with colors ranging from white, pink, tan, to brown.   The spots are dry, scaly, and sometimes itchy   Can appear anywhere on the body, but more commonly over the neck, trunk, and arms   Spots can grow together forming larger patches   May disappear when temperature drops and return once it becomes warm again  Pale spots can be confused with vitiligo     How do we diagnose tinea versicolor?  Tinea versicolor is usually diagnosed with a history and physical examination. However, the following tests may be useful for confirmation when in doubt.  Wood lamp (black light) examination-- yellow-green glow may be observed in affected areas  Microscopy using potassium hydroxide (KOH) to examine skin scrapings  Fungal culture--this is usually reported to be negative, as it is quite difficult to persuade the yeasts to grow in a laboratory  Skin biopsy--fungal elements may be seen within the outer cells of the skin (stratum corneum) on histopathology     How do we treat tinea versicolor?   There are many different options to treat tinea versicolor. The treatment chosen may depend on how thick the spots have grown and how much of the body has  been affected. Mild tinea versicolor can be treated with primarily topical antifungal agents. These include:  Ketoconazole cream/shampoo  Selenium sulfide   Terbinafine gel   Ciclopirox cream/solution   Propylene glycol solution   Sodium thiosulphate solution   Topical medications should be applied widely to affected areas before bedtime for between three days to two weeks depending on your dermatologists recommendation.   Use of medicated cleansers once or twice a month may prevent recurrence in those who have has multiple bouts of yeast overgrowth      For extensive skin involvement or after failure of topical medications, oral antifungal agents such as itraconazole and fluconazole can be used. Oral terbinafine used to treat dermatophyte infections is not effective against tinea versicolor.   Vigorous exercise an hour after taking the medication may help sweat it onto the skin surface and enhance clearance of the yeas

## 2024-01-16 NOTE — PATIENT INSTRUCTIONS
What is skin cancer?  Skin cancer is unfortunately very common. That's why we are here to help you on your journey to healthy happy skin! There are two main types of skin cancer: melanoma and non-melanoma skin cancer. Melanoma is a form of skin cancer that often arises within an existing nevus or mole. However, this is not always the case. Melanoma can arise anywhere (not only where you have moles right now). Melanoma can run in families, so letting us know about your family history is important. Non-melanoma skin cancer is the most common type of cancer in the [place]. The two main types of non-melanoma skin cancers are basal cell carcinomas (BCC) and squamous cell carcinoma (SCC). These cancers tend to be less aggressive than melanomas but are still important to look for and treat.    What can I do to prevent skin cancer?  One of the largest risk factors for skin cancer is sun exposure or UV radiation. Therefore, sun protection is watson! Here are some great tips for protecting yourself!  Try to avoid direct sun exposure during peak sun hours (10 AM to 2 PM)  Remember you get A LOT of sun even under cloud coverage and through care windows!  When choosing a sunscreen, look for one that says “broad spectrum” sunscreen. This means it protects you from more of the harmful UV rays.   Choose a sunscreen that is SPF 30 or greater for best protection.   Apply sunscreen to all sun-exposed skin and reapply every 2 hours.   Consider sun protective clothing! Great additions to your sun protective clothing wardrobe include broad brimmed hats, sunglasses, UPF clothing.  Avoid tanning salons. These have been shown to be very harmful in terms of your risk of skin cancer.   Avoid “base tans”. We now know that tans are dangerous (not just sun burns). If you want to have a tan for a trip, consider a spray tan!    Should I check my skin at home between my dermatology appointments?  Yes! It's always a great idea to look at your  skin on a regular basis. Here are some things to look for when monitoring your skin.   For melanoma, look for the ABCDE's!  A = Asymmetry. Look for a spot where one half does not match the other!  B = Borders. Look for a spot that has jagged, ragged or irregular borders.  C = Color. Look for a spot that is not evenly colored and often includes multiple colors, especially true black, red, white, blue, grey.   D = Diameter. Look for a spot that is larger than the size of a pencil eraser.  E = Evolution. If you ever have a spot that is changing in shape, color, size or symptoms (becomes itchy, painful or starts to bleed), always call us!  For non-melanoma skin cancers, look for a new, pink spot that is not going away, especially one that is itchy, painful or bleeding.     What should I do if I see a spot that is concerning for melanoma or non-melanoma skin cancer?  If you are ever concerned, call us! Do not wait for your next appointment. We want to help!

## 2024-03-13 NOTE — PATIENT INSTRUCTIONS
Problem List Items Addressed This Visit     Hutton's esophagus with dysplasia      Follow-up GI for surveillance, and continue proton pump inhibitor as per GI         Relevant Medications    pantoprazole (PROTONIX) 40 mg tablet    Major depressive disorder, single episode     Follow up psychiatry, and discussed the possibility of titrating off         Relevant Medications    escitalopram (LEXAPRO) 10 mg tablet    Encounter for wellness examination - Primary      Last colonoscopy was 04/07/2017, and he is due for repeat 2022  Discussed preventative health, cancer screening, immunizations, and safety issues    Discussed that he should wear seatbelt, not get sunburned, etc    Patient is getting the flu shot at work, prescription for shingles shot given           Other Visit Diagnoses     Need for shingles vaccine        Relevant Medications    Zoster Vac Recomb Adjuvanted ([address]) 48 MCG SUSR    Screening for diabetes mellitus        Relevant Orders    Comprehensive metabolic panel    Screening for thyroid disorder        Relevant Orders    TSH, 3rd generation with Free T4 reflex    Screening for hypercholesterolemia        Relevant Orders    Lipid Panel with Direct LDL reflex    Screening for prostate cancer        Relevant Orders    PSA, Total Screen    Need for hepatitis C screening test        Encounter for screening for diseases of the blood and blood-forming organs and certain disorders involving the immune mechanism        Relevant Orders    CBC and differential    Hepatitis C antibody    Erectile dysfunction, unspecified erectile dysfunction type        Relevant Medications    sildenafil (VIAGRA) 50 MG tablet    Other Relevant Orders    Testosterone    Weight gain        Relevant Orders    Ambulatory referral to Weight Management Spoke to patient (verified Name and ) and relayed Dr. Hernandez's message below. Patient verbalized understanding and had no further questions at this time.

## 2024-03-14 ENCOUNTER — TELEPHONE (OUTPATIENT)
Dept: DERMATOLOGY | Facility: CLINIC | Age: 59
End: 2024-03-14

## 2024-03-14 NOTE — TELEPHONE ENCOUNTER
Called patient to advise his appt with Dr. Villagomez on 8/15 needs to be r/s, as provider will be out of the office. Appt was r/s for 8/8 @ 9:40 with Dr. Villagomez in [facility] office. Left a message for patient to call the office to confirm or to r/s if needed.

## 2024-05-01 DIAGNOSIS — K21.9 GASTROESOPHAGEAL REFLUX DISEASE, UNSPECIFIED WHETHER ESOPHAGITIS PRESENT: ICD-10-CM

## 2024-05-01 RX ORDER — PANTOPRAZOLE SODIUM 40 MG/1
40 TABLET, DELAYED RELEASE ORAL DAILY
Qty: 90 TABLET | Refills: 1 | Status: SHIPPED | OUTPATIENT
Start: 2024-05-01

## 2024-06-19 DIAGNOSIS — N52.9 ERECTILE DYSFUNCTION, UNSPECIFIED ERECTILE DYSFUNCTION TYPE: ICD-10-CM

## 2024-06-19 RX ORDER — SILDENAFIL 50 MG/1
50 TABLET, FILM COATED ORAL DAILY PRN
Qty: 10 TABLET | Refills: 5 | Status: SHIPPED | OUTPATIENT
Start: 2024-06-19

## 2024-08-08 ENCOUNTER — OFFICE VISIT (OUTPATIENT)
Dept: DERMATOLOGY | Facility: CLINIC | Age: 59
End: 2024-08-08
Payer: COMMERCIAL

## 2024-08-08 VITALS — TEMPERATURE: 96.6 F | WEIGHT: 198.8 LBS | HEIGHT: 70 IN | BODY MASS INDEX: 28.46 KG/M2

## 2024-08-08 DIAGNOSIS — L82.1 SEBORRHEIC KERATOSIS: ICD-10-CM

## 2024-08-08 DIAGNOSIS — Z85.820 HISTORY OF MELANOMA: Primary | ICD-10-CM

## 2024-08-08 DIAGNOSIS — D22.9 MULTIPLE MELANOCYTIC NEVI: ICD-10-CM

## 2024-08-08 DIAGNOSIS — D18.01 CHERRY ANGIOMA: ICD-10-CM

## 2024-08-08 DIAGNOSIS — L81.4 LENTIGINES: ICD-10-CM

## 2024-08-08 DIAGNOSIS — B07.9 VIRAL WARTS, UNSPECIFIED TYPE: ICD-10-CM

## 2024-08-08 DIAGNOSIS — Z85.828 HISTORY OF BASAL CELL CARCINOMA: ICD-10-CM

## 2024-08-08 DIAGNOSIS — B36.0 TINEA VERSICOLOR: ICD-10-CM

## 2024-08-08 PROCEDURE — 99214 OFFICE O/P EST MOD 30 MIN: CPT | Performed by: DERMATOLOGY

## 2024-08-08 RX ORDER — SELENIUM SULFIDE 2.5 MG/100ML
LOTION TOPICAL
Qty: 118 ML | Refills: 3 | Status: SHIPPED | OUTPATIENT
Start: 2024-08-08

## 2024-08-08 NOTE — PATIENT INSTRUCTIONS
TINEA VERSICOLOR    Assessment and Plan:  Based on a thorough discussion of this condition and the management approach to it (including a comprehensive discussion of the known risks, side effects and potential benefits of treatment), the patient (family) agrees to implement the following specific plan:  Can use Selenium sulfide Shampoo 2.5%- Daily for 2 weeks straight and then twice a week; Lather into scalp and skin on face, neck, chest, and back; leave on for 5 minutes and then rinse off completely.  What is tinea versicolor?  Tinea versicolor or pityriasis versicolor is a type of fungal infection on the skin due to a yeast that lives on all of us. It Is due an overgrowth of a type of yeast called Malassezia furfur, which feeds on oils in the skin and thrives in warm, humid environments. Anyone can develop tinea versicolor, but it is more common during the summer months and in tropical climates. Those who tend to sweat more heavily are also at higher risk. Although it is not considered infectious, multiple family members can be affected.   Teens and young adults are most susceptible due to having oily skin   Affects people of all skin colors   Weakened immune system predisposes to development      What are the clinical symptoms of tinea versicolor?   The first sign of tinea versicolor is often spots on the skin. They can be lighter or darker than surrounding skin, with colors ranging from white, pink, tan, to brown.   The spots are dry, scaly, and sometimes itchy   Can appear anywhere on the body, but more commonly over the neck, trunk, and arms   Spots can grow together forming larger patches   May disappear when temperature drops and return once it becomes warm again  Pale spots can be confused with vitiligo     How do we diagnose tinea versicolor?  Tinea versicolor is usually diagnosed with a history and physical examination. However, the following tests may be useful for confirmation when in doubt.  Wood lamp  "(black light) examination-- yellow-green glow may be observed in affected areas  Microscopy using potassium hydroxide (KOH) to examine skin scrapings  Fungal culture--this is usually reported to be negative, as it is quite difficult to persuade the yeasts to grow in a laboratory  Skin biopsy--fungal elements may be seen within the outer cells of the skin (stratum corneum) on histopathology     How do we treat tinea versicolor?   There are many different options to treat tinea versicolor. The treatment chosen may depend on how thick the spots have grown and how much of the body has been affected. Mild tinea versicolor can be treated with primarily topical antifungal agents. These include:  Ketoconazole cream/shampoo  Selenium sulfide   Terbinafine gel   Ciclopirox cream/solution   Propylene glycol solution   Sodium thiosulphate solution   Topical medications should be applied widely to affected areas before bedtime for between three days to two weeks depending on your dermatologists recommendation.   Use of medicated cleansers once or twice a month may prevent recurrence in those who have has multiple bouts of yeast overgrowth      For extensive skin involvement or after failure of topical medications, oral antifungal agents such as itraconazole and fluconazole can be used. Oral terbinafine used to treat dermatophyte infections is not effective against tinea versicolor.   Vigorous exercise an hour after taking the medication may help sweat it onto the skin surface and enhance clearance of the yeast    VERRUCA VULGARIS (\"Common Warts\")    Assessment and Plan:     - We discussed that warts are caused by a virus  - We discussed treatment options including topical salicylic acid, destructive modalities, removal, topical immunotherapy, and observation, including risks, benefits, and alternatives  - Based on a thorough discussion of this condition and the management approach to it (including a comprehensive discussion of " the known risks, side effects and potential benefits of treatment), the patient (family) agrees to implement the following specific plan:    Every night follow the below steps after allowing healing from liquid nitrogen in office treatment (see procedure note below):     (1) Wash wart with warm water or shower.   (2) Use pumice stone or similar tool to remove dead skin on top of wart.   (3) Apply 40% Salicylic Acid WartSTICK.   (4) Cover area with duct tape (or band aid).   (5) Remove duct tape the following day.   (6) Repeat steps 1-5.             SEBORRHEIC KERATOSES  - Relevant exam: Scattered over the trunk/extremities are waxy brown to black plaques and papules with stuck on appearance and consistent dermoscopy  - Exam and clinical history consistent with seborrheic keratoses  - Counseled that these are benign growths that do not require treatment    MULTIPLE MELANOCYTIC NEVI  -Relevant exam: Scattered over the trunk/extremities are homogenously pigmented brown macules and papules. ELM performed and without concerning findings. No outliers unless otherwise noted in today's note  - Exam and clinical history consistent with melanocytic nevi  - Counseled to return to clinic prior to scheduled appointment should any of these lesions change or should any new lesions of concern arise  - Counseled on use of sun protection daily. Reviewed latest FDA sunscreen guidelines, including use of broad spectrum (UVA and UVB blocking) sunscreen or sun protective clothing with SPF 30-50 every 2-3 hours and reapplied after exposure to water    LENTIGINES  OTHER SKIN CHANGES DUE TO CHRONIC EXPOSURE TO NONIONIZING RADIATION  - Relevant exam: Over sun exposed areas are brown macules. ELM performed and without concerning findings.  - Exam and clinical history consistent with lentigines.  - Counseled to return to clinic prior to scheduled appointment should any of these lesions change or should any new lesions of concern arise.  -  Recommended use of sunscreen as above and below.    CHERRY ANGIOMAS  - Relevant exam: Scattered over the trunk/extremities are red papules  - Exam and clinical history consistent with cherry angiomas  - Educated that these are benign

## 2024-08-08 NOTE — PROGRESS NOTES
"[facility] Note     Patient Name: nIessa Vazquez  Encounter Date: 8/8/2024     Have you been cared for by a [facility] in the last 3 years and, if so, which description applies to you?    Yes.  I have been here within the last 3 years, and my medical history has NOT changed since that time.  I am MALE/not capable of bearing children.    REVIEW OF SYSTEMS:  Have you recently had or currently have any of the following? No changes in my recent health.   PAST MEDICAL HISTORY:  Have you personally ever had or currently have any of the following?  If \"YES,\" then please provide more detail. No changes in my medical history.   HISTORY OF IMMUNOSUPPRESSION: Do you have a history of any of the following:  Systemic Immunosuppression such as Diabetes, Biologic or Immunotherapy, Chemotherapy, Organ Transplantation, Bone Marrow Transplantation?  No     Answering \"YES\" requires the addition of the dotphrase \"IMMUNOSUPPRESSED\" as the first diagnosis of the patient's visit.   FAMILY HISTORY:  Any \"first degree relatives\" (parent, brother, sister, or child) with the following?    No changes in my family's known health.   PATIENT EXPERIENCE:    Do you want the Dermatologist to perform a COMPLETE skin exam today including a clinical examination under the \"bra and underwear\" areas?  Yes  If necessary, do we have your permission to call and leave a detailed message on your Preferred Phone number that includes your specific medical information?  Yes      Allergies   Allergen Reactions   • Diflucan [Fluconazole] Other (See Comments)     Mouth sores      Current Outpatient Medications:   •  ketoconazole (NIZORAL) 2 % shampoo, Daily for 2 weeks straight and then on \"Mondays, Wednesdays and Fridays\":  Lather into scalp and skin on face, neck, chest, and back; leave on for 5 minutes and then rinse off completely., Disp: 120 mL, Rfl: 5  •  oxiconazole (Oxistat) 1 % lotion, To affected area  Twice daily, Disp: 60 " mL, Rfl: 12  •  pantoprazole (PROTONIX) 40 mg tablet, TAKE 1 TABLET (40 MG TOTAL) BY MOUTH DAILY, Disp: 90 tablet, Rfl: 1  •  selenium sulfide (SELSUN) 2.5 % shampoo, Daily for 2 weeks straight and then twice a week; Lather into scalp and skin on face, neck, chest, and back; leave on for 5 minutes and then rinse off completely., Disp: 118 mL, Rfl: 3  •  sertraline (ZOLOFT) 100 mg tablet, Take 150 mg by mouth daily, Disp: , Rfl:   •  sildenafil (VIAGRA) 50 MG tablet, Take 1 tablet (50 mg total) by mouth daily as needed for erectile dysfunction, Disp: 10 tablet, Rfl: 5  •  sucralfate (CARAFATE) 1 g tablet, Take 1 tablet (1 g total) by mouth 4 (four) times a day (Patient not taking: Reported on 10/5/2022), Disp: 120 tablet, Rfl: 1          Whom besides the patient is providing clinical information about today's encounter?   NO ADDITIONAL HISTORIAN (patient alone provided history)    Physical Exam and Assessment/Plan by Diagnosis:         HISTORY OF MELANOMA in situ     Physical Exam:  Anatomic Location Affected:  Neck, Back, Calf  Morphological Description of Scar:  Well healed scar  Year Treated: 10-15 years ago  Suspected Recurrence: no  Regional adenopathy: no     Additional History of Present Condition:  Patient status post Melanoma of neck, back and calf.     Assessment and Plan:  Based on a thorough discussion of this condition and the management approach to it (including a comprehensive discussion of the known risks, side effects and potential benefits of treatment), the patient (family) agrees to implement the following specific plan:  Continue skin exams with Dermatology.  Apply SPF 50 or higher multiple times a day.  Wear sun protecting clothing and hats.  Monitor skin for any changes.  Future physician- Dr Valiente, Dr Balderas or Dr Tidwell   Note patient has history decades ago of highly dysplastic nevi with some lesion read as borderline melanoma. He has not history of high grade atypical lesions.     What  "happens at follow-up?  The main purpose of follow-up is to detect recurrences early (metastatic melanoma), but it also offers an opportunity to diagnose a new primary melanoma at the first possible opportunity. A second invasive melanoma occurs in 5-10% of melanoma patients and a new melanoma in situ is diagnosed in more than 20% of melanoma patients.     Our practice makes the following recommendations for follow-up for patients with invasive melanoma.  At-least \"monthly\" self-skin examinations   Routine skin checks by a board certified dermatologist  Follow-up intervals are \"every 3 months\" within 2 years of a new melanoma diagnosis; \"every 6 months\" between 2-4 years of a new melanoma diagnosis; and \"annually\" after 4 years of a new melanoma diagnosis  Individual patient's needs should be considered before an appropriate follow-up is offered  Provide education and support to help the patient adjust to their illness     Follow-up appointments should include:  A check of the scar where the primary melanoma was removed  Checking the regional lymph nodes  A general skin examination  A full physical examination at least annually by your primary care physician     In those with more advanced primary disease, follow-up may include:  Blood tests  Imaging: ultrasound, X-ray, CT, MRI and PET scan.     Most tests are not worthwhile for patients with stage 1 or 2 melanoma unless there are signs or symptoms of disease recurrence or metastasis. No tests are necessary for healthy patients who have remained well for five years or longer after removal of their melanoma.     What is the outlook for patients with melanoma?  Melanoma in situ is cured by excision because it has no potential to spread around the body.  The risk of spread and ultimate death from invasive melanoma depends on several factors, but the main one is the Breslow thickness of the melanoma at the time it was surgically removed.  Metastases are rare for " melanomas < 0.75 mm and the risk for tumours 0.75-1 mm thick is about 5%. The risk steadily increases with thickness so that melanomas > 4 mm have a risk of metastasis of about 40%.     Melanoma is a potentially serious type of skin cancer, in which there is uncontrolled growth of melanocytes (pigment cells). Melanoma is sometimes called malignant melanoma.  Normal melanocytes are found in the basal layer of the epidermis (the outer layer of skin). Melanocytes produce a protein called melanin, which protects skin cells by absorbing ultraviolet (UV) radiation. Melanocytes are found in equal numbers in black and white skin, but melanocytes in black skin produce much more melanin. People with dark brown or black skin are very much less likely to be damaged by UV radiation than those with white skin.     HISTORY OF BASAL CELL CARCINOMA     Physical Exam:  Anatomic Location Affected:  Right Shoulder   Morphological Description of scar:  Well healed scar   Suspected Recurrence: No  Pertinent Positives:  Pertinent Negatives:        Additional History of Present Condition:  History of basal cell carcinoma with no sign of recurrence     Assessment and Plan:  Based on a thorough discussion of this condition and the management approach to it (including a comprehensive discussion of the known risks, side effects and potential benefits of treatment), the patient (family) agrees to implement the following specific plan:  No sign of recurrence   Continue skin examinations      How can basal cell carcinoma be prevented?  The most important way to prevent BCC is to avoid sunburn. This is especially important in childhood and early life. Fair skinned individuals and those with a personal or family history of BCC should protect their skin from sun exposure daily, year-round and lifelong.  Stay indoors or under the shade in the middle of the day   Wear covering clothing   Apply high protection factor SPF50+ broad-spectrum sunscreens  generously to exposed skin if outdoors   Avoid indoor tanning (sun beds, solaria)  Oral nicotinamide (vitamin B3) in a dose of 500 mg twice daily may reduce the number and severity of BCCs.     What is the outlook for basal cell carcinoma?  Most BCCs are cured by treatment. Cure is most likely if treatment is undertaken when the lesion is small.  About 50% of people with BCC develop a second one within 3 years of the first. They are also at increased risk of other skin cancers, especially melanoma. Regular self-skin examinations and long-term annual skin checks by an experienced health professional are recommended.      . TINEA VERSICOLOR     Physical Exam:  Anatomic Location Affected:  Scattered   Morphological Description:  Hypopigmented scaling macules upper chest and back.  Pertinent Positives:  Pertinent Negatives:     Additional History of Present Condition:  Patient has tried Oxistat 1% lotion and ketoconazole 2% shampoo     Assessment and Plan:  Based on a thorough discussion of this condition and the management approach to it (including a comprehensive discussion of the known risks, side effects and potential benefits of treatment), the patient (family) agrees to implement the following specific plan:  Can use Selenium sulfide Shampoo 2.5%- Daily for 2 weeks straight and then twice a week; Lather into scalp and skin on face, neck, chest, and back; leave on for 5 minutes and then rinse off completely.  What is tinea versicolor?  Tinea versicolor or pityriasis versicolor is a type of fungal infection on the skin due to a yeast that lives on all of us. It Is due an overgrowth of a type of yeast called Malassezia furfur, which feeds on oils in the skin and thrives in warm, humid environments. Anyone can develop tinea versicolor, but it is more common during the summer months and in tropical climates. Those who tend to sweat more heavily are also at higher risk. Although it is not considered infectious, multiple  family members can be affected.   Teens and young adults are most susceptible due to having oily skin   Affects people of all skin colors   Weakened immune system predisposes to development      What are the clinical symptoms of tinea versicolor?   The first sign of tinea versicolor is often spots on the skin. They can be lighter or darker than surrounding skin, with colors ranging from white, pink, tan, to brown.   The spots are dry, scaly, and sometimes itchy   Can appear anywhere on the body, but more commonly over the neck, trunk, and arms   Spots can grow together forming larger patches   May disappear when temperature drops and return once it becomes warm again  Pale spots can be confused with vitiligo     How do we diagnose tinea versicolor?  Tinea versicolor is usually diagnosed with a history and physical examination. However, the following tests may be useful for confirmation when in doubt.  Wood lamp (black light) examination-- yellow-green glow may be observed in affected areas  Microscopy using potassium hydroxide (KOH) to examine skin scrapings  Fungal culture--this is usually reported to be negative, as it is quite difficult to persuade the yeasts to grow in a laboratory  Skin biopsy--fungal elements may be seen within the outer cells of the skin (stratum corneum) on histopathology     How do we treat tinea versicolor?   There are many different options to treat tinea versicolor. The treatment chosen may depend on how thick the spots have grown and how much of the body has been affected. Mild tinea versicolor can be treated with primarily topical antifungal agents. These include:  Ketoconazole cream/shampoo  Selenium sulfide   Terbinafine gel   Ciclopirox cream/solution   Propylene glycol solution   Sodium thiosulphate solution   Topical medications should be applied widely to affected areas before bedtime for between three days to two weeks depending on your dermatologists recommendation.   Use of  "medicated cleansers once or twice a month may prevent recurrence in those who have has multiple bouts of yeast overgrowth      For extensive skin involvement or after failure of topical medications, oral antifungal agents such as itraconazole and fluconazole can be used. Oral terbinafine used to treat dermatophyte infections is not effective against tinea versicolor.   Vigorous exercise an hour after taking the medication may help sweat it onto the skin surface and enhance clearance of the yeast    VERRUCA VULGARIS (\"Common Warts\")  Physical Exam:  Anatomic Location Affected:  on the left hand    Morphological Description: 2 mm  skin colored verrucous papules  Pertinent Positives:  Pertinent Negatives:    Additional History of Present Condition:  present on exam     Assessment and Plan:     - We discussed that warts are caused by a virus  - We discussed treatment options including topical salicylic acid, destructive modalities, removal, topical immunotherapy, and observation, including risks, benefits, and alternatives  - Based on a thorough discussion of this condition and the management approach to it (including a comprehensive discussion of the known risks, side effects and potential benefits of treatment), the patient (family) agrees to implement the following specific plan:    Every night follow the below steps after allowing healing from liquid nitrogen in office treatment (see procedure note below):     (1) Wash wart with warm water or shower.   (2) Use pumice stone or similar tool to remove dead skin on top of wart.   (3) Apply 40% Salicylic Acid WartSTICK.   (4) Cover area with duct tape (or band aid).   (5) Remove duct tape the following day.   (6) Repeat steps 1-5.             SEBORRHEIC KERATOSES  - Relevant exam: Scattered over the trunk/extremities are waxy brown to black plaques and papules with stuck on appearance and consistent dermoscopy  - Exam and clinical history consistent with seborrheic " keratoses  - Counseled that these are benign growths that do not require treatment    MULTIPLE MELANOCYTIC NEVI  -Relevant exam: Scattered over the trunk/extremities are homogenously pigmented brown macules and papules. ELM performed and without concerning findings. No outliers unless otherwise noted in today's note  - Exam and clinical history consistent with melanocytic nevi  - Counseled to return to clinic prior to scheduled appointment should any of these lesions change or should any new lesions of concern arise  - Counseled on use of sun protection daily. Reviewed latest FDA sunscreen guidelines, including use of broad spectrum (UVA and UVB blocking) sunscreen or sun protective clothing with SPF 30-50 every 2-3 hours and reapplied after exposure to water    LENTIGINES  OTHER SKIN CHANGES DUE TO CHRONIC EXPOSURE TO NONIONIZING RADIATION  - Relevant exam: Over sun exposed areas are brown macules. ELM performed and without concerning findings.  - Exam and clinical history consistent with lentigines.  - Counseled to return to clinic prior to scheduled appointment should any of these lesions change or should any new lesions of concern arise.  - Recommended use of sunscreen as above and below.    CHERRY ANGIOMAS  - Relevant exam: Scattered over the trunk/extremities are red papules  - Exam and clinical history consistent with cherry angiomas  - Educated that these are benign        Scribe Attestation    I,:  Nimo Beckwith MA am acting as a scribe while in the presence of the attending physician.:       I,:  Yusuf Villagomez MD personally performed the services described in this documentation    as scribed in my presence.:

## 2024-10-08 ENCOUNTER — TELEPHONE (OUTPATIENT)
Dept: DERMATOLOGY | Facility: CLINIC | Age: 59
End: 2024-10-08

## 2024-10-08 NOTE — TELEPHONE ENCOUNTER
LMOM that appt on 02/21/25 time needed to change due to a change in Lamar's sched, to please call the office to confirm or r/s

## 2024-10-16 DIAGNOSIS — Z23 NEED FOR IMMUNIZATION AGAINST INFLUENZA: Primary | ICD-10-CM

## 2024-11-02 DIAGNOSIS — N52.9 ERECTILE DYSFUNCTION, UNSPECIFIED ERECTILE DYSFUNCTION TYPE: ICD-10-CM

## 2024-11-04 RX ORDER — SILDENAFIL 50 MG/1
TABLET, FILM COATED ORAL
Qty: 30 TABLET | Refills: 5 | Status: SHIPPED | OUTPATIENT
Start: 2024-11-04

## 2024-12-10 ENCOUNTER — OFFICE VISIT (OUTPATIENT)
Dept: INTERNAL MEDICINE CLINIC | Facility: CLINIC | Age: 59
End: 2024-12-10
Payer: COMMERCIAL

## 2024-12-10 VITALS
BODY MASS INDEX: 28.18 KG/M2 | DIASTOLIC BLOOD PRESSURE: 80 MMHG | SYSTOLIC BLOOD PRESSURE: 138 MMHG | OXYGEN SATURATION: 98 % | HEART RATE: 78 BPM | WEIGHT: 196.4 LBS

## 2024-12-10 DIAGNOSIS — Z11.4 SCREENING FOR HIV (HUMAN IMMUNODEFICIENCY VIRUS): ICD-10-CM

## 2024-12-10 DIAGNOSIS — Z12.11 SCREENING FOR COLON CANCER: ICD-10-CM

## 2024-12-10 DIAGNOSIS — Z00.00 WELLNESS EXAMINATION: ICD-10-CM

## 2024-12-10 DIAGNOSIS — E78.00 HYPERCHOLESTEREMIA: ICD-10-CM

## 2024-12-10 DIAGNOSIS — R73.01 IMPAIRED FASTING GLUCOSE: ICD-10-CM

## 2024-12-10 DIAGNOSIS — K21.00 GASTROESOPHAGEAL REFLUX DISEASE WITH ESOPHAGITIS WITHOUT HEMORRHAGE: ICD-10-CM

## 2024-12-10 DIAGNOSIS — M79.18 LEFT BUTTOCK PAIN: ICD-10-CM

## 2024-12-10 DIAGNOSIS — F32.9 MAJOR DEPRESSIVE DISORDER WITH SINGLE EPISODE, REMISSION STATUS UNSPECIFIED: ICD-10-CM

## 2024-12-10 DIAGNOSIS — Z11.59 NEED FOR HEPATITIS C SCREENING TEST: ICD-10-CM

## 2024-12-10 DIAGNOSIS — F41.9 ANXIETY: ICD-10-CM

## 2024-12-10 DIAGNOSIS — K22.719 BARRETT'S ESOPHAGUS WITH DYSPLASIA: Primary | ICD-10-CM

## 2024-12-10 DIAGNOSIS — M79.18 LEFT BUTTOCK PAIN: Primary | ICD-10-CM

## 2024-12-10 DIAGNOSIS — Z12.5 SCREENING FOR PROSTATE CANCER: ICD-10-CM

## 2024-12-10 DIAGNOSIS — E55.9 VITAMIN D DEFICIENCY: ICD-10-CM

## 2024-12-10 PROCEDURE — 99214 OFFICE O/P EST MOD 30 MIN: CPT | Performed by: INTERNAL MEDICINE

## 2024-12-10 PROCEDURE — 99396 PREV VISIT EST AGE 40-64: CPT | Performed by: INTERNAL MEDICINE

## 2024-12-10 NOTE — ASSESSMENT & PLAN NOTE
Patient denies any significant GERD symptoms, no problems with food getting stuck, no black tarry stool.  Continue PPI as per GI, and follow-up with GI for surveillance  Orders:  •  Ambulatory referral to Gastroenterology; Future

## 2024-12-10 NOTE — PROGRESS NOTES
Name: Inessa Vazquez      : 1965      MRN: [id number]  Encounter Provider: Cachorro Villalobos MD  Encounter Date: 12/10/2024   Encounter department: [facility]    Assessment & Plan  Hutton's esophagus with dysplasia  Patient denies any significant GERD symptoms, no problems with food getting stuck, no black tarry stool.  Continue PPI as per GI, and follow-up with GI for surveillance  Orders:  •  Ambulatory referral to Gastroenterology; Future    Gastroesophageal reflux disease with esophagitis without hemorrhage  Patient denies any significant GERD symptoms, no problems with food getting stuck, no black tarry stool.  Continue PPI as per GI, and follow-up with GI for surveillance  Orders:  •  Ambulatory referral to Gastroenterology; Future    Wellness examination  Discussed preventative health, cancer screening, immunizations, and safety issues.  Colonoscopy  with recommendations recheck again in 5 years, patient referred to GI for follow-up of this.  Patient had the flu shot this season.  I recommend some screening labs.    I recommend getting the Shingrix shot to help prevent Shingles.  You can get it a pharmacy, and they can administer it there.  It is a two shot series with the second shot needed between 2-6 months after the first shot.  I would not recommend getting the shot before an important or fun event in case you were to have a reaction to the shot like a sore arm or flu-like symptoms.  I make the same recommendation about any shot, as people can have a reaction to any shot.       Major depressive disorder with single episode, remission status unspecified  Continue sertraline 150 mg daily, follow-up psychiatry       Screening for colon cancer    Orders:  •  Ambulatory referral to Gastroenterology; Future    Need for hepatitis C screening test    Orders:  •  Hepatitis C Antibody; Future    Screening for HIV (human immunodeficiency virus)    Orders:  •  HIV 1/2 AG/AB w Reflex  [facility] for 2 yr old and above; Future    Left buttock pain  No radiation down legs, will get physical therapy, patient take anti-inflammatory.       Anxiety  Continue sertraline follow-up psychiatry       Impaired fasting glucose    Orders:  •  Hemoglobin A1C; Future    Vitamin D deficiency    Orders:  •  Vitamin D 25 hydroxy; Future    Screening for prostate cancer    Orders:  •  PSA, Total Screen; Future    Hypercholesteremia    Orders:  •  CBC and differential; Future  •  Comprehensive metabolic panel; Future  •  Lipid Panel with Direct LDL reflex; Future  •  TSH, 3rd generation with Free T4 reflex; Future         History of Present Illness     Patient not seen in 3 years.  Pt having pain in left buttock after doing a lot of painting.  No radiation down legs.  No fevers or chills, no change in urine or bowels.  No rash in the area.    Wellness: Patient gets routine dental care, no smoking cigarettes, eats a healthy diet and exercises      Review of Systems   Constitutional:  Negative for chills, fatigue and fever.   HENT:  Negative for congestion, nosebleeds, postnasal drip, sore throat and trouble swallowing.    Eyes:  Negative for pain.   Respiratory:  Negative for cough, chest tightness, shortness of breath and wheezing.    Cardiovascular:  Negative for chest pain, palpitations and leg swelling.   Gastrointestinal:  Negative for abdominal pain, constipation, diarrhea, nausea and vomiting.   Endocrine: Negative for polydipsia and polyuria.   Genitourinary:  Negative for dysuria, flank pain and hematuria.   Musculoskeletal:  Positive for back pain. Negative for arthralgias.   Skin:  Negative for rash.   Neurological:  Negative for dizziness, tremors, light-headedness and headaches.   Hematological:  Does not bruise/bleed easily.   Psychiatric/Behavioral:  Positive for dysphoric mood. Negative for confusion. The patient is nervous/anxious.      Past Medical History:   Diagnosis Date   • Hutton esophagus 1996   •  "Melanoma (HCC) 10 years ago    Back   • Melanoma (HCC) 10 years ago    Neck   • Melanoma (HCC) 15 years plus     calf    • Skin cancer 2021    Basil cell     Past Surgical History:   Procedure Laterality Date   • BACK SURGERY     • SKIN BIOPSY     • SKIN CANCER EXCISION      back neck calf    • TONSILLECTOMY     • UVULECTOMY       Family History   Problem Relation Age of Onset   • Melanoma Maternal Grandmother      Social History     Tobacco Use   • Smoking status: Never   • Smokeless tobacco: Never   Vaping Use   • Vaping status: Never Used   Substance and Sexual Activity   • Alcohol use: Yes     Alcohol/week: 4.0 standard drinks of alcohol     Types: 4 Standard drinks or equivalent per week   • Drug use: No   • Sexual activity: Yes     Partners: Female     Birth control/protection: Surgical, Male Sterilization     Current Outpatient Medications on File Prior to Visit   Medication Sig   • ketoconazole (NIZORAL) 2 % shampoo Daily for 2 weeks straight and then on \"Mondays, Wednesdays and Fridays\":  Lather into scalp and skin on face, neck, chest, and back; leave on for 5 minutes and then rinse off completely.   • oxiconazole (Oxistat) 1 % lotion To affected area  Twice daily   • pantoprazole (PROTONIX) 40 mg tablet TAKE 1 TABLET (40 MG TOTAL) BY MOUTH DAILY   • selenium sulfide (SELSUN) 2.5 % shampoo Daily for 2 weeks straight and then twice a week; Lather into scalp and skin on face, neck, chest, and back; leave on for 5 minutes and then rinse off completely.   • sertraline (ZOLOFT) 100 mg tablet Take 150 mg by mouth daily   • sildenafil (VIAGRA) 50 MG tablet TAKE 1 TABLET BY MOUTH DAILY AS NEEDED FOR ERECTILE DYSFUNCTION   • sucralfate (CARAFATE) 1 g tablet Take 1 tablet (1 g total) by mouth 4 (four) times a day (Patient not taking: Reported on 10/5/2022)     Allergies   Allergen Reactions   • Diflucan [Fluconazole] Other (See Comments)     Mouth sores     Immunization History   Administered Date(s) Administered "   • COVID-19 PFIZER VACCINE 0.3 ML IM 03/22/2021, 04/14/2021, 12/10/2021   • COVID-19 Pfizer Vac BIVALENT Jefry-sucrose 12 Yr+ IM 10/05/2022   • H1N1, All Formulations 12/22/2009   • INFLUENZA 10/13/2011, 10/22/2014   • Influenza, injectable, quadrivalent, preservative free 0.5 mL 10/10/2019, 10/22/2020, 09/23/2021, 10/20/2022   • Influenza, seasonal, injectable, preservative free 10/16/2024     Objective   /80   Pulse 78   Wt 89.1 kg (196 lb 6.4 oz)   SpO2 98%   BMI 28.18 kg/m²     Physical Exam  Vitals reviewed.   Constitutional:       General: He is not in acute distress.     Appearance: Normal appearance. He is well-developed.   HENT:      Head: Normocephalic and atraumatic.      Right Ear: External ear normal.      Left Ear: External ear normal.      Nose: Nose normal.   Eyes:      General: No scleral icterus.     Conjunctiva/sclera: Conjunctivae normal.   Neck:      Trachea: No tracheal deviation.   Cardiovascular:      Rate and Rhythm: Normal rate and regular rhythm.      Heart sounds: Normal heart sounds. No murmur heard.  Pulmonary:      Effort: Pulmonary effort is normal. No respiratory distress.      Breath sounds: Normal breath sounds. No wheezing or rales.   Abdominal:      General: Bowel sounds are normal.      Palpations: Abdomen is soft. There is no mass.      Tenderness: There is no abdominal tenderness. There is no guarding.      Hernia: There is no hernia in the left inguinal area or right inguinal area.   Genitourinary:     Testes: Normal.   Musculoskeletal:      Cervical back: Normal range of motion and neck supple.      Right lower leg: No edema.      Left lower leg: No edema.      Comments: Straight leg raise tests negative bilaterally, negative ROC test, good range of motion the hips, no pain with adduction of legs, no tenderness over spine   Lymphadenopathy:      Cervical: No cervical adenopathy.   Skin:     Coloration: Skin is not jaundiced or pale.   Neurological:      General:  No focal deficit present.      Mental Status: He is alert and oriented to person, place, and time.   Psychiatric:         Mood and Affect: Mood normal.         Behavior: Behavior normal.         Thought Content: Thought content normal.         Judgment: Judgment normal.

## 2024-12-10 NOTE — ASSESSMENT & PLAN NOTE
Discussed preventative health, cancer screening, immunizations, and safety issues.  Colonoscopy 2017 with recommendations recheck again in 5 years, patient referred to GI for follow-up of this.  Patient had the flu shot this season.  I recommend some screening labs.    I recommend getting the Shingrix shot to help prevent Shingles.  You can get it a pharmacy, and they can administer it there.  It is a two shot series with the second shot needed between 2-6 months after the first shot.  I would not recommend getting the shot before an important or fun event in case you were to have a reaction to the shot like a sore arm or flu-like symptoms.  I make the same recommendation about any shot, as people can have a reaction to any shot.

## 2024-12-10 NOTE — PATIENT INSTRUCTIONS
Problem List Items Addressed This Visit          Digestive    Hutton's esophagus with dysplasia - Primary    Patient denies any significant GERD symptoms, no problems with food getting stuck, no black tarry stool.  Continue PPI as per GI, and follow-up with GI for surveillance         Relevant Orders    Ambulatory referral to Gastroenterology    Gastroesophageal reflux disease with esophagitis without hemorrhage    Patient denies any significant GERD symptoms, no problems with food getting stuck, no black tarry stool.  Continue PPI as per GI, and follow-up with GI for surveillance         Relevant Orders    Ambulatory referral to Gastroenterology       Behavioral Health    Major depressive disorder, single episode    Continue sertraline 150 mg daily, follow-up psychiatry         Anxiety    Continue sertraline follow-up psychiatry            Surgery/Wound/Pain    Left buttock pain    No radiation down legs, will get physical therapy, patient take anti-inflammatory.            Other    Wellness examination    Discussed preventative health, cancer screening, immunizations, and safety issues.  Colonoscopy 2017 with recommendations recheck again in 5 years, patient referred to GI for follow-up of this.  Patient had the flu shot this season.  I recommend some screening labs.    I recommend getting the Shingrix shot to help prevent Shingles.  You can get it a pharmacy, and they can administer it there.  It is a two shot series with the second shot needed between 2-6 months after the first shot.  I would not recommend getting the shot before an important or fun event in case you were to have a reaction to the shot like a sore arm or flu-like symptoms.  I make the same recommendation about any shot, as people can have a reaction to any shot.          Other Visit Diagnoses         Screening for colon cancer        Relevant Orders    Ambulatory referral to Gastroenterology      Need for hepatitis C screening test         Relevant Orders    Hepatitis C Antibody      Screening for HIV (human immunodeficiency virus)        Relevant Orders    HIV 1/2 AG/AB w Reflex [facility] for 2 yr old and above      Impaired fasting glucose        Relevant Orders    Hemoglobin A1C      Vitamin D deficiency        Relevant Orders    Vitamin D 25 hydroxy      Screening for prostate cancer        Relevant Orders    PSA, Total Screen      Hypercholesteremia        Relevant Orders    CBC and differential    Comprehensive metabolic panel    Lipid Panel with Direct LDL reflex    TSH, 3rd generation with Free T4 reflex

## 2024-12-11 ENCOUNTER — TELEPHONE (OUTPATIENT)
Age: 59
End: 2024-12-11

## 2024-12-11 DIAGNOSIS — M79.18 LEFT BUTTOCK PAIN: Primary | ICD-10-CM

## 2024-12-11 RX ORDER — CYCLOBENZAPRINE HCL 10 MG
10 TABLET ORAL 3 TIMES DAILY PRN
Qty: 30 TABLET | Refills: 1 | Status: SHIPPED | OUTPATIENT
Start: 2024-12-11

## 2024-12-11 NOTE — TELEPHONE ENCOUNTER
Done, let pt know.  I guess there is a miscommunication, we have talked about using the heating pad which works similarly to the muscle relaxant, but he can do both, I sent the muscle relaxant to use as needed

## 2024-12-11 NOTE — TELEPHONE ENCOUNTER
Pt stated on  12/10/24 PCP  discussed prescribing muscle relaxer to help with buttock pain. Pt didn't receive med script. Please send to:      [facility]/pharmacy #[id number] - [place], [place] - [address]     Thank you for your help.

## 2024-12-17 ENCOUNTER — EVALUATION (OUTPATIENT)
Dept: PHYSICAL THERAPY | Facility: OTHER | Age: 59
End: 2024-12-17
Payer: COMMERCIAL

## 2024-12-17 DIAGNOSIS — M79.18 LEFT BUTTOCK PAIN: ICD-10-CM

## 2024-12-17 PROCEDURE — 97161 PT EVAL LOW COMPLEX 20 MIN: CPT

## 2024-12-17 PROCEDURE — 97112 NEUROMUSCULAR REEDUCATION: CPT

## 2024-12-17 NOTE — PROGRESS NOTES
"PT Evaluation     Today's date: 2024  Patient name: Inessa Vazquez  : 1965  MRN: [id number]  Referring provider: Cachorro Villalobos MD  Dx:   Encounter Diagnosis     ICD-10-CM    1. Left buttock pain  M79.18 Ambulatory Referral to Physical Therapy          Start Time: 1610  Stop Time: 1653  Total time in clinic (min): 43 minutes    Assessment  Impairments: abnormal or restricted ROM, lacks appropriate home exercise program and pain with function    Assessment details: Inessa Vazquez is a 58 y.o. male presenting to OPPT initial evaluation with chief complaint of left buttock pain for the last 3-4 weeks after 4-days straight of painting and lots of stair climbing. He first noted a pain under his \"sit bones\" the following day after painting, worse with extending leg straight and stretching hamstring. Pain described as tightness, aggravated by hip extension, sit to stand, stair climbing. Pt notes he began taking muscle relaxants two days ago and woke up this morning with significant improvements in pain, experiencing no pain today. Pt reports no functional limitations today with pain-free ability to climb stairs and perform sit-to-stand. Pt apprehensive to straight leg raise / hamstring stretching and does not want to make things worse. Denies sleep disturbances. Denies numbness / tingling with absence of sx down the leg.     Upon PT examination, pt presents with (-) tenderness or hypertonicity in L proximal hamstring with fair HS flexibility, (-) hip testing, and absence of LBP. Primary finding on exam with reproduction of comparable sign is decreased sciatic neurodynamics, in which pt feels similar tightness near ischial tuberosity during slump testing. No functional limitations or pain with subjective reporting and objective performance of STS / step-ups. Pt given HEP of foam rolling, sciatic nerve gliding and light-resistance leg curls. Pt education provided regarding neurodynamic mobility versus hamstring " "tightness. Recommended heat over ice for increased soft tissue / nerve extensibility. Pt will reach out for follow-up if pain re-introduces itself, otherwise no follow-up necessary at this time.        Goals  STG (2 weeks):  Pt will have (-) slump testing indicative of improved sciatic neurodynamic mobility  Pt's self-perceived disability will decrease as demonstrated by a FOTO score >70.  Pt will be independent with progressions to HEP as demonstrated by return demo with proper technique and execution of exercises provided.      Plan    Frequency: no follow-up necessary.  Plan of Care beginning date: 2024  Plan of Care expiration date: 2024  Treatment plan discussed with: patient        Subjective Evaluation    History of Present Illness  Mechanism of injury: Inessa Vazquez is a 58 y.o. male presenting to OPPT initial evaluation with chief complaint of left buttock pain for the last 3-4 weeks after 4-days straight of painting and lots of stair climbing. He first noted a pain under his \"sit bones\" the following day after painting, worse with extending leg straight and stretching hamstring. Pain described as tightness, aggravated by hip extension, sit to stand, stair climbing. Pt notes he began taking muscle relaxants two days ago and woke up this morning with significant improvements in pain, experiencing no pain today. Pt reports no functional limitations today with pain-free ability to climb stairs and perform sit-to-stand. Pt apprehensive to straight leg raise / hamstring stretching and does not want to make things worse. Denies sleep disturbances. Denies numbness / tingling with absence of sx down the leg.     H/o of microdiscectomy L5-S1  Patient Goals  Patient goals for therapy: increased motion  Patient goal: Get clarity on what he can / can't do  Pain  Current pain ratin  At best pain ratin  At worst pain ratin      Diagnostic Tests  No diagnostic tests performed  Treatments  No " "previous or current treatments      Objective    (-) tenderness or hypertonicity B/L piriformis & hamstrings  B/L hip ROM WNL in all directions, pain-free  90/90 HS: ~50 deg B/L  5/5 R leg flexion MMT, 4+/5 L leg flexion MMT  Able to perform prone hip extension against gravity, no pain  (-) SLR, ~60 deg B/L  (+) L Slump test for reproduction of sx, limited neurodynamic mobility bilaterally    Functional exam reveals no aberrant movement or pain with L 8\" step-up & STS       Precautions: h/o L5/S1 microdiscectomy      Manuals 12/17            FR Glutes, piriformis, HS    SFP                                                   Neuro Re-Ed             Seated slump glides HEP            90/90 sciatic nerve glides HEP                                                                             Ther Ex             Leg curl M. HEP                                                                                                       Ther Activity                                       Gait Training                                       Modalities                                            "

## 2025-02-21 ENCOUNTER — OFFICE VISIT (OUTPATIENT)
Dept: DERMATOLOGY | Facility: CLINIC | Age: 60
End: 2025-02-21

## 2025-02-21 VITALS — BODY MASS INDEX: 27.84 KG/M2 | WEIGHT: 194 LBS | TEMPERATURE: 98.4 F

## 2025-02-21 DIAGNOSIS — D48.5 NEOPLASM OF UNCERTAIN BEHAVIOR OF SKIN: ICD-10-CM

## 2025-02-21 DIAGNOSIS — D22.61 MULTIPLE BENIGN MELANOCYTIC NEVI OF BOTH UPPER EXTREMITIES, BOTH LOWER EXTREMITIES, AND TRUNK: ICD-10-CM

## 2025-02-21 DIAGNOSIS — L81.4 SOLAR LENTIGO: ICD-10-CM

## 2025-02-21 DIAGNOSIS — D22.5 MULTIPLE BENIGN MELANOCYTIC NEVI OF BOTH UPPER EXTREMITIES, BOTH LOWER EXTREMITIES, AND TRUNK: ICD-10-CM

## 2025-02-21 DIAGNOSIS — D18.01 CHERRY ANGIOMA: ICD-10-CM

## 2025-02-21 DIAGNOSIS — D23.9 DERMATOFIBROMA: ICD-10-CM

## 2025-02-21 DIAGNOSIS — Z12.83 SKIN EXAM, SCREENING FOR CANCER: ICD-10-CM

## 2025-02-21 DIAGNOSIS — D22.72 MULTIPLE BENIGN MELANOCYTIC NEVI OF BOTH UPPER EXTREMITIES, BOTH LOWER EXTREMITIES, AND TRUNK: ICD-10-CM

## 2025-02-21 DIAGNOSIS — D22.62 MULTIPLE BENIGN MELANOCYTIC NEVI OF BOTH UPPER EXTREMITIES, BOTH LOWER EXTREMITIES, AND TRUNK: ICD-10-CM

## 2025-02-21 DIAGNOSIS — Z86.006 HISTORY OF MELANOMA IN SITU: Primary | ICD-10-CM

## 2025-02-21 DIAGNOSIS — D22.71 MULTIPLE BENIGN MELANOCYTIC NEVI OF BOTH UPPER EXTREMITIES, BOTH LOWER EXTREMITIES, AND TRUNK: ICD-10-CM

## 2025-02-21 DIAGNOSIS — Z85.828 HISTORY OF BASAL CELL CARCINOMA: ICD-10-CM

## 2025-02-21 DIAGNOSIS — L82.1 SEBORRHEIC KERATOSES: ICD-10-CM

## 2025-02-21 DIAGNOSIS — L72.0 EPIDERMAL CYST: ICD-10-CM

## 2025-02-21 PROCEDURE — 88305 TISSUE EXAM BY PATHOLOGIST: CPT | Performed by: STUDENT IN AN ORGANIZED HEALTH CARE EDUCATION/TRAINING PROGRAM

## 2025-02-21 PROCEDURE — 88341 IMHCHEM/IMCYTCHM EA ADD ANTB: CPT | Performed by: STUDENT IN AN ORGANIZED HEALTH CARE EDUCATION/TRAINING PROGRAM

## 2025-02-21 PROCEDURE — 88342 IMHCHEM/IMCYTCHM 1ST ANTB: CPT | Performed by: STUDENT IN AN ORGANIZED HEALTH CARE EDUCATION/TRAINING PROGRAM

## 2025-02-21 NOTE — PATIENT INSTRUCTIONS
" HISTORY OF MELANOMA in situ      Assessment and Plan:  Based on a thorough discussion of this condition and the management approach to it (including a comprehensive discussion of the known risks, side effects and potential benefits of treatment), the patient (family) agrees to implement the following specific plan:  Continue skin exams with Dermatology.  Apply SPF 50 or higher multiple times a day.  Wear sun protecting clothing and hats.  Monitor skin for any changes.  Note patient has history decades ago of highly dysplastic nevi with some lesion read as borderline melanoma. He has no history of high grade atypical lesions.     What happens at follow-up?  The main purpose of follow-up is to detect recurrences early (metastatic melanoma), but it also offers an opportunity to diagnose a new primary melanoma at the first possible opportunity. A second invasive melanoma occurs in 5-10% of melanoma patients and a new melanoma in situ is diagnosed in more than 20% of melanoma patients.     Our practice makes the following recommendations for follow-up for patients with invasive melanoma.  At-least \"monthly\" self-skin examinations   Routine skin checks by a board certified dermatologist  Follow-up intervals are \"every 3 months\" within 2 years of a new melanoma diagnosis; \"every 6 months\" between 2-4 years of a new melanoma diagnosis; and \"annually\" after 4 years of a new melanoma diagnosis  Individual patient's needs should be considered before an appropriate follow-up is offered  Provide education and support to help the patient adjust to their illness     Follow-up appointments should include:  A check of the scar where the primary melanoma was removed  Checking the regional lymph nodes  A general skin examination  A full physical examination at least annually by your primary care physician     In those with more advanced primary disease, follow-up may include:  Blood tests  Imaging: ultrasound, X-ray, CT, MRI and PET " scan.     Most tests are not worthwhile for patients with stage 1 or 2 melanoma unless there are signs or symptoms of disease recurrence or metastasis. No tests are necessary for healthy patients who have remained well for five years or longer after removal of their melanoma.     What is the outlook for patients with melanoma?  Melanoma in situ is cured by excision because it has no potential to spread around the body.  The risk of spread and ultimate death from invasive melanoma depends on several factors, but the main one is the Breslow thickness of the melanoma at the time it was surgically removed.  Metastases are rare for melanomas < 0.75 mm and the risk for tumours 0.75-1 mm thick is about 5%. The risk steadily increases with thickness so that melanomas > 4 mm have a risk of metastasis of about 40%.     Melanoma is a potentially serious type of skin cancer, in which there is uncontrolled growth of melanocytes (pigment cells). Melanoma is sometimes called malignant melanoma.  Normal melanocytes are found in the basal layer of the epidermis (the outer layer of skin). Melanocytes produce a protein called melanin, which protects skin cells by absorbing ultraviolet (UV) radiation. Melanocytes are found in equal numbers in black and white skin, but melanocytes in black skin produce much more melanin. People with dark brown or black skin are very much less likely to be damaged by UV radiation than those with white skin.     HISTORY OF BASAL CELL CARCINOMA      Assessment and Plan:  Based on a thorough discussion of this condition and the management approach to it (including a comprehensive discussion of the known risks, side effects and potential benefits of treatment), the patient (family) agrees to implement the following specific plan:  No sign of recurrence   Continue skin examinations      How can basal cell carcinoma be prevented?  The most important way to prevent BCC is to avoid sunburn. This is especially  "important in childhood and early life. Fair skinned individuals and those with a personal or family history of BCC should protect their skin from sun exposure daily, year-round and lifelong.  Stay indoors or under the shade in the middle of the day   Wear covering clothing   Apply high protection factor SPF50+ broad-spectrum sunscreens generously to exposed skin if outdoors   Avoid indoor tanning (sun beds, solaria)  Oral nicotinamide (vitamin B3) in a dose of 500 mg twice daily may reduce the number and severity of BCCs.     What is the outlook for basal cell carcinoma?  Most BCCs are cured by treatment. Cure is most likely if treatment is undertaken when the lesion is small.  About 50% of people with BCC develop a second one within 3 years of the first. They are also at increased risk of other skin cancers, especially melanoma. Regular self-skin examinations and long-term annual skin checks by an experienced health professional are recommended.    NEOPLASM OF UNCERTAIN BEHAVIOR OF SKIN    Assessment and Plan:  I have discussed with the patient that a sample of skin via a \"skin biopsy” would be potentially helpful to further make a specific diagnosis under the microscope.  Based on a thorough discussion of this condition and the management approach to it (including a comprehensive discussion of the known risks, side effects and potential benefits of treatment), the patient (family) agrees to implement the following specific plan:    Procedure:  Skin Biopsy.  After a thorough discussion of treatment options and risk/benefits/alternatives (including but not limited to local pain, scarring, dyspigmentation, blistering, possible superinfection, and inability to confirm a diagnosis via histopathology), verbal and written consent were obtained and portion of the rash was biopsied for tissue sample.  See below for consent that was obtained from patient and subsequent Procedure Note.    PROCEDURE TANGENTIAL (SHAVE) BIOPSY " "NOTE:    After obtaining informed consent  at which time there was a discussion about the purpose of biopsy  and low risks of infection and bleeding.  The area was prepped and draped in the usual fashion. Anesthesia was obtained with 1% lidocaine with epinephrine. A shave biopsy to an appropriate sampling depth was obtained by Shave (Dermablade or 15 blade) The resulting wound was covered with surgical ointment and bandaged appropriately.     The patient tolerated the procedure well without complications and was without signs of functional compromise.      Specimen has been sent for review by Dermatopathology.    Standard post-procedure care has been explained and has been included in written form within the patient's copy of Informed Consent.    INFORMED CONSENT DISCUSSION AND POST-OPERATIVE INSTRUCTIONS FOR PATIENT    I.  RATIONALE FOR PROCEDURE  I understand that a skin biopsy allows the Dermatologist to test a lesion or rash under the microscope to obtain a diagnosis.  It usually involves numbing the area with numbing medication and removing a small piece of skin; sometimes the area will be closed with sutures. In this specific procedure, sutures are not usually needed.  If any sutures are placed, then they are usually need to be removed in 2 weeks or less.    I understand that my Dermatologist recommends that a skin \"shave\" biopsy be performed today.  A local anesthetic, similar to the kind that a dentist uses when filling a cavity, will be injected with a very small needle into the skin area to be sampled.  The injected skin and tissue underneath \"will go to sleep” and become numb so no pain should be felt afterwards.  An instrument shaped like a tiny \"razor blade\" (shave biopsy instrument) will be used to cut a small piece of tissue and skin from the area so that a sample of tissue can be taken and examined more closely under the microscope.  A slight amount of bleeding will occur, but it will be stopped with " "direct pressure and a pressure bandage and any other appropriate methods.  I understands that a scar will form where the wound was created.  Surgical ointment will be applied to help protect the wound.  Sutures are not usually needed.    II.  RISKS AND POTENTIAL COMPLICATIONS   I understand the risks and potential complications of a skin biopsy include but are not limited to the following:  Bleeding  Infection  Pain  Scar/keloid  Skin discoloration  Incomplete Removal  Recurrence  Nerve Damage/Numbness/Loss of Function  Allergic Reaction to Anesthesia  Biopsies are diagnostic procedures and based on findings additional treatment or evaluation may be required  Loss or destruction of specimen resulting in no additional findings    My Dermatologist has explained to me the nature of the condition, the nature of the procedure, and the benefits to be reasonably expected compared with alternative approaches.  My Dermatologist has discussed the likelihood of major risks or complications of this procedure including the specific risks listed above, such as bleeding, infection, and scarring/keloid.  I understand that a scar is expected after this procedure.  I understand that my physician cannot predict if the scar will form a \"keloid,\" which extends beyond the borders of the wound that is created.  A keloid is a thick, painful, and bumpy scar.  A keloid can be difficult to treat, as it does not always respond well to therapy, which includes injecting cortisone directly into the keloid every few weeks.  While this usually reduces the pain and size of the scar, it does not eliminate it.      I understand that photographs may be taken before and after the procedure.  These will be maintained as part of the medical providers confidential records and may not be made available to me.  I further authorize the medical provider to use the photographs for teaching purposes or to illustrate scientific papers, books, or lectures if in " "his/her judgment, medical research, education, or science may benefit from its use.    I have had an opportunity to fully inquire about the risks and benefits of this procedure and its alternatives.   I have been given ample time and opportunity to ask questions and to seek a second opinion if I wished to do so.  I acknowledge that there have specifically been no guarantees as to the cosmetic results from the procedure.  I am aware that with any procedure there is always the possibility of an unexpected complication.    III. POST-PROCEDURAL CARE (WHAT YOU WILL NEED TO DO \"AFTER THE BIOPSY\" TO OPTIMIZE HEALING)    Keep the area clean and dry.  Try NOT to remove the bandage or get it wet for the first 24 hours.    Gently clean the area and apply surgical ointment (such as Vaseline petrolatum ointment, which is available \"over the counter\" and not a prescription) to the biopsy site for up to 2 weeks straight.  This acts to protect the wound from the outside world.      Sutures are not usually placed in this procedure.  If any sutures were placed, return for suture removal as instructed (generally 1 week for the face, 2 weeks for the body).      Take Acetaminophen (Tylenol) for discomfort, if no contraindications.  Ibuprofen or aspirin could make bleeding worse.    Call our office immediately for signs of infection: fever, chills, increased redness, warmth, tenderness, discomfort/pain, or pus or foul smell coming from the wound.    WHAT TO DO IF THERE IS ANY BLEEDING?  If a small amount of bleeding is noticed, place a clean cloth over the area and apply firm pressure for ten minutes.  Check the wound after 10 minutes of direct pressure.  If bleeding persists, try one more time for an additional 10 minutes of direct pressure on the area.  If the bleeding becomes heavier or does not stop after the second attempt, or if you have any other questions about this procedure, then please call your [facility] Dermatologist by " "calling [phone] (SKIN).     I hereby acknowledge that I have reviewed and verified the site with my Dermatologist and have requested and authorized my Dermatologist to proceed with the procedure.    DERMATOFIBROMA    Assessment and Plan:  Based on a thorough discussion of this condition and the management approach to it (including a comprehensive discussion of the known risks, side effects and potential benefits of treatment), the patient (family) agrees to implement the following specific plan:  Reassure benign    Assessment and Plan:  A dermatofibroma is a common benign fibrous nodule that most often arises on the skin of the lower legs.  A dermatofibroma is also called a \"cutaneous fibrous histiocytoma.\"  Dermatofibromas occur at all ages and in people of every ethnicity. They are more common in women than in men.    It is not clear if dermatofibroma is a reactive process or if it is a neoplasm. The lesions are made up of proliferating fibroblasts. Histiocytes may also be involved.  They are sometimes attributed to an insect bite or ingrownhair or local trauma, but not consistently. They may be more numerous in patients with altered immunity.    Dermatofibromas most often occur on the legs and arms, but may also arise on the trunk or any site of the body.  Typical clinical features include the following:  People may have 1 or up to 15 lesions.  Size varies from 0.5-1.5 cm diameter; most lesions are 7-10 mm diameter.  They are firm nodules tethered to the skin surface and mobile over subcutaneous tissue.  The skin \"dimples\" on pinching the lesion.  Color may be pink to light brown in white skin, and dark brown to black in dark skin; some appear paler in the center.  They do not usually cause symptoms, but they are sometimes painful or itchy.  Because they are often raised lesions, they may be traumatized, for example by a razor.  Occasionally dozens may erupt within a few months, usually in the setting of " immunosuppression (for example autoimmune disease, cancer or certain medications).  Dermatofibroma does not give rise to cancer. However, occasionally, it may be mistaken for dermatofibrosarcoma or desmoplastic melanoma.    A dermatofibroma is harmless and seldom causes any symptoms. Usually, only reassurance is needed. If it is nuisance or causing concern, the lesion can be removed surgically, resulting in a scar that is, by definition, usually longer in diameter than the widest portion of the dermatofibroma.  Cryotherapy, shave biopsy and laser surgery are rarely completely successful.  Skin punch biopsy or incisional biopsy may be undertaken if there is an atypical feature such as recent enlargement, ulceration, or asymmetrical structures and colours on dermatoscopy.    EPIDERMAL INCLUSION CYST    Assessment and Plan:  Based on a thorough discussion of this condition and the management approach to it (including a comprehensive discussion of the known risks, side effects and potential benefits of treatment), the patient (family) agrees to implement the following specific plan:  Discussed excision, patient defers treatment at this time as it does not bother him.    What are epidermal inclusion cysts?  Epidermal inclusion cysts are the most common, benign cutaneous cysts. There are many different names for epidermal inclusion cysts, including epidermoid cyst, epidermal cyst, infundibular cyst, inclusion cyst, and keratin cyst. These cysts can occur anywhere on the body and typically present as nodules directly underneath the skin. There is often a visible pore or opening in the center. The cysts are freely moveable and can range from a few millimeters to several centimeters in diameter. The center of epidermoid cysts almost always contains keratin, which has a cheesy appearance, and not sebum. They also do not originate from sebaceous glands. Therefore, epidermal inclusion cysts are not the same as sebaceous  cysts.    Cysts may remain stable or progressively enlarge over time. There are no reliable predictive factors to tell if an epidermal inclusion cyst will enlarge, become inflamed, or remain quiescent. Infected cysts tend to become larger, turn red, and are more noticeable to the patient. There may be accompanying pain and discomfort.     What causes epidermal inclusion cysts?  Epidermal inclusion cysts often appear out of the blue and are not contagious. They are due to a proliferation of epidermal cells within the dermis and are more common in men than women. They occur more frequently in patients in their 20s to 40s. Epidermal inclusion cysts by themselves are usually not inherited, but they can be hereditary in rare syndromes such as Porras syndrome, nodular elastosis with cysts and comedones (Favre-Racouchot syndrome), and basal cell nevus syndrome (Gorlin syndrome). Elderly patients with chronic sun-damaged skin areas have a higher likelihood of developing epidermoid cysts. They often occur in areas where hair follicles have been inflamed or repeatedly irritated are more frequent in patients with acne vulgaris. In the  period, they are called milia.     Patients on BRAF inhibitors such as imiquimod and cyclosporine have a higher incidence of epidermoid cysts of the face.    How do we diagnose an epidermal inclusion cyst?  Epidermoid inclusion cysts are often diagnosed by history and physical exam. There is usually no need for biopsy prior to removal.  Radiographic and laboratory exams, such as ultrasound studies, are unnecessary and not typically ordered unless the practitioner suspects a genetic condition.    What is the treatment for an epidermal inclusion cyst?  Inflamed, uninfected epidermal inclusion cysts rarely resolve spontaneously without therapy or surgical intervention. Treatment is not emergent unless desired by the patient.     Definitive treatment is via surgical excision with walls  "intact. This method will prevent recurrence. This is best done when the cyst is not inflamed, to decrease the probability of rupture during surgery.   A local anesthetic will be injected around the cyst  A small incision is made in the skin overlying the cyst, and contents are expressed  The incision is repaired with sutures    Another option is to use a 4mm punch biopsy with cyst extraction through the defect.    Incision and drainage is often needed if the cyst is infected or inflamed. If there is surrounding cellulitis, oral antibiotic therapy may be necessary. The common agents used target methicillin sensitive Staphylococcal aureus and methicillin resistant S aureus in areas of high prevalence.     CHERRY ANGIOMAS     Physical Exam:  Anatomic Location Affected:  Trunk and extremities  Morphological Description:  Scattered cherry red papules  Denies pain, itch, bleeding. No treatments tried. Present for years. Present constantly; no modifying factors which make it worse or better.     Assessment and Plan:  Based on a thorough discussion of this condition and the management approach to it (including a comprehensive discussion of the known risks, side effects and potential benefits of treatment), the patient (family) agrees to implement the following specific plan:  Reassure benign      SEBORRHEIC KERATOSIS; NON-INFLAMED     Physical Exam:  Anatomic Location Affected:  Trunk and extremities  Morphological Description:  Waxy, smooth to warty textured, yellow to brownish-grey to dark brown to blackish, discrete, \"stuck-on\" appearing papules.  Present for years. Denies pain, itch, bleeding.      Additional History of Present Condition:  Present constantly; no modifying factors which make it worse or better. No prior treatment.       Assessment and Plan:  Based on a thorough discussion of this condition and the management approach to it (including a comprehensive discussion of the known risks, side effects and " "potential benefits of treatment), the patient (family) agrees to implement the following specific plan:  Reassure benign  Use sun protection.  Apply SPF 30 or higher at least three times a day.  Wear sun protecting clothing and hats.      SOLAR LENTIGINES   OTHER SKIN CHANGES DUE TO CHRONIC EXPOSURE TO NONIONIZING RADIATION     Physical Exam:  Anatomic Location Affected:  Sun exposed areas of back, chest, arms, legs  Morphological Description:  Multiple scattered brown to tan evenly pigmented macules   Denies pain, itch, bleeding. No treatments tried. Present for months - years. Reports getting newer lesions with sun exposure.       Assessment and Plan:  Based on a thorough discussion of this condition and the management approach to it (including a comprehensive discussion of the known risks, side effects and potential benefits of treatment), the patient (family) agrees to implement the following specific plan:  Reassure benign  Use sun protection.  Apply SPF 30 or higher at least three times a day.  Wear sun protecting clothing and hats.       MULTIPLE MELANOCYTIC NEVI (\"Moles\")     Physical Exam:  Anatomic Location Affected: Trunk and extremities  Morphological Description:  Scattered, round to ovoid, symmetrical-appearing, even bordered, skin colored to dark brown macules/papules  Denies pain, itch, bleeding. No treatments tried. Present for years. Present constantly; no modifying factors which make it worse or better. Denies actively changing or growing moles.      Assessment and Plan:  Based on a thorough discussion of this condition and the management approach to it (including a comprehensive discussion of the known risks, side effects and potential benefits of treatment), the patient (family) agrees to implement the following specific plan:  Reassure benign  Monitor for changes  Use sun protection.  Apply SPF 30 or higher at least three times a day.  Wear sun protecting clothing and hats.     Worrisome signs " of skin malignancy discussed, questions answered. Regular self-skin check discussed. Advised to call or return to office if patient notices any spots of concern, rapidly growing/changing lesions, bleeding lesions, non-healing lesions. Advised regular SPF use.

## 2025-02-21 NOTE — PROGRESS NOTES
"[facility] Note     Patient Name: Inessa Vazquez  Encounter Date: 2/21/2025     Have you been cared for by a [facility] Dermatologist in the last 3 years and, if so, which description applies to you?    Yes.  I have been here within the last 3 years, and my medical history has NOT changed since that time.  I am MALE/not capable of bearing children.    REVIEW OF SYSTEMS:  Have you recently had or currently have any of the following? No changes in my recent health.   PAST MEDICAL HISTORY:  Have you personally ever had or currently have any of the following?  If \"YES,\" then please provide more detail. No changes in my medical history.   HISTORY OF IMMUNOSUPPRESSION: Do you have a history of any of the following:  Systemic Immunosuppression such as Diabetes, Biologic or Immunotherapy, Chemotherapy, Organ Transplantation, Bone Marrow Transplantation or Prednisone?  No     Answering \"YES\" requires the addition of the dotphrase \"IMMUNOSUPPRESSED\" as the first diagnosis of the patient's visit.   FAMILY HISTORY:  Any \"first degree relatives\" (parent, brother, sister, or child) with the following?    No changes in my family's known health.   PATIENT EXPERIENCE:    Do you want the Dermatologist to perform a COMPLETE skin exam today including a clinical examination under the \"bra and underwear\" areas?  Yes  If necessary, do we have your permission to call and leave a detailed message on your Preferred Phone number that includes your specific medical information?  Yes      Allergies   Allergen Reactions   • Diflucan [Fluconazole] Other (See Comments)     Mouth sores      Current Outpatient Medications:   •  ketoconazole (NIZORAL) 2 % shampoo, Daily for 2 weeks straight and then on \"Mondays, Wednesdays and Fridays\":  Lather into scalp and skin on face, neck, chest, and back; leave on for 5 minutes and then rinse off completely., Disp: 120 mL, Rfl: 5  •  oxiconazole (Oxistat) 1 % lotion, To affected area  Twice " daily, Disp: 60 mL, Rfl: 12  •  selenium sulfide (SELSUN) 2.5 % shampoo, Daily for 2 weeks straight and then twice a week; Lather into scalp and skin on face, neck, chest, and back; leave on for 5 minutes and then rinse off completely., Disp: 118 mL, Rfl: 3  •  sertraline (ZOLOFT) 100 mg tablet, Take 150 mg by mouth daily, Disp: , Rfl:   •  sildenafil (VIAGRA) 50 MG tablet, TAKE 1 TABLET BY MOUTH DAILY AS NEEDED FOR ERECTILE DYSFUNCTION, Disp: 30 tablet, Rfl: 5  •  cyclobenzaprine (FLEXERIL) 10 mg tablet, Take 1 tablet (10 mg total) by mouth 3 (three) times a day as needed for muscle spasms, Disp: 30 tablet, Rfl: 1  •  pantoprazole (PROTONIX) 40 mg tablet, TAKE 1 TABLET (40 MG TOTAL) BY MOUTH DAILY, Disp: 90 tablet, Rfl: 1  •  sucralfate (CARAFATE) 1 g tablet, Take 1 tablet (1 g total) by mouth 4 (four) times a day (Patient not taking: Reported on 10/5/2022), Disp: 120 tablet, Rfl: 1        Whom besides the patient is providing clinical information about today's encounter?   NO ADDITIONAL HISTORIAN (patient alone provided history)    Physical Exam and Assessment/Plan by Diagnosis:     HISTORY OF MELANOMA in situ     Physical Exam:  Anatomic Location Affected:  Neck, Back, Calf  Morphological Description of Scar:  Well healed scar  Year Treated: 10-15 years ago  Suspected Recurrence: no  Regional adenopathy: not assessed     Additional History of Present Condition:  Patient status post Melanoma of neck, back and calf.     Assessment and Plan:  Based on a thorough discussion of this condition and the management approach to it (including a comprehensive discussion of the known risks, side effects and potential benefits of treatment), the patient (family) agrees to implement the following specific plan:  Continue skin exams with Dermatology q 6 months   Apply SPF 50 or higher multiple times a day.  Wear sun protecting clothing and hats.  Monitor skin for any changes.  Note patient has history of decades ago of highly  "dysplastic nevi with some lesions read as borderline melanoma. He has no history of high grade atypical lesions.     What happens at follow-up?  The main purpose of follow-up is to detect recurrences early (metastatic melanoma), but it also offers an opportunity to diagnose a new primary melanoma at the first possible opportunity. A second invasive melanoma occurs in 5-10% of melanoma patients and a new melanoma in situ is diagnosed in more than 20% of melanoma patients.     Our practice makes the following recommendations for follow-up for patients with invasive melanoma.  At-least \"monthly\" self-skin examinations   Routine skin checks by a board certified dermatologist  Follow-up intervals are \"every 3 months\" within 2 years of a new melanoma diagnosis; \"every 6 months\" between 2-4 years of a new melanoma diagnosis; and \"annually\" after 4 years of a new melanoma diagnosis  Individual patient's needs should be considered before an appropriate follow-up is offered  Provide education and support to help the patient adjust to their illness     Follow-up appointments should include:  A check of the scar where the primary melanoma was removed  Checking the regional lymph nodes  A general skin examination  A full physical examination at least annually by your primary care physician     In those with more advanced primary disease, follow-up may include:  Blood tests  Imaging: ultrasound, X-ray, CT, MRI and PET scan.     Most tests are not worthwhile for patients with stage 1 or 2 melanoma unless there are signs or symptoms of disease recurrence or metastasis. No tests are necessary for healthy patients who have remained well for five years or longer after removal of their melanoma.     What is the outlook for patients with melanoma?  Melanoma in situ is cured by excision because it has no potential to spread around the body.  The risk of spread and ultimate death from invasive melanoma depends on several factors, but the " main one is the Breslow thickness of the melanoma at the time it was surgically removed.  Metastases are rare for melanomas < 0.75 mm and the risk for tumours 0.75-1 mm thick is about 5%. The risk steadily increases with thickness so that melanomas > 4 mm have a risk of metastasis of about 40%.     Melanoma is a potentially serious type of skin cancer, in which there is uncontrolled growth of melanocytes (pigment cells). Melanoma is sometimes called malignant melanoma.  Normal melanocytes are found in the basal layer of the epidermis (the outer layer of skin). Melanocytes produce a protein called melanin, which protects skin cells by absorbing ultraviolet (UV) radiation. Melanocytes are found in equal numbers in black and white skin, but melanocytes in black skin produce much more melanin. People with dark brown or black skin are very much less likely to be damaged by UV radiation than those with white skin.     HISTORY OF BASAL CELL CARCINOMA     Physical Exam:  Anatomic Location Affected:  Right Shoulder   Morphological Description of scar:  Well healed scar   Suspected Recurrence: No  Pertinent Positives:  Pertinent Negatives:      Additional History of Present Condition:  History of basal cell carcinoma with no sign of recurrence     Assessment and Plan:  Based on a thorough discussion of this condition and the management approach to it (including a comprehensive discussion of the known risks, side effects and potential benefits of treatment), the patient (family) agrees to implement the following specific plan:  No sign of recurrence   Continue skin examinations      How can basal cell carcinoma be prevented?  The most important way to prevent BCC is to avoid sunburn. This is especially important in childhood and early life. Fair skinned individuals and those with a personal or family history of BCC should protect their skin from sun exposure daily, year-round and lifelong.  Stay indoors or under the shade in  "the middle of the day   Wear covering clothing   Apply high protection factor SPF50+ broad-spectrum sunscreens generously to exposed skin if outdoors   Avoid indoor tanning (sun beds, solaria)  Oral nicotinamide (vitamin B3) in a dose of 500 mg twice daily may reduce the number and severity of BCCs.     What is the outlook for basal cell carcinoma?  Most BCCs are cured by treatment. Cure is most likely if treatment is undertaken when the lesion is small.  About 50% of people with BCC develop a second one within 3 years of the first. They are also at increased risk of other skin cancers, especially melanoma. Regular self-skin examinations and long-term annual skin checks by an experienced health professional are recommended.    NEOPLASM OF UNCERTAIN BEHAVIOR OF SKIN    Physical Exam:  (Anatomic Location); (Size and Morphological Description); (Differential Diagnosis):  Right superior lateral shoulder; 0.5 x 0.6 cm irregular pink and brown macule ; r/o atypia      Additional History of Present Condition:  found on exam    Assessment and Plan:  I have discussed with the patient that a sample of skin via a \"skin biopsy” would be potentially helpful to further make a specific diagnosis under the microscope.  Based on a thorough discussion of this condition and the management approach to it (including a comprehensive discussion of the known risks, side effects and potential benefits of treatment), the patient (family) agrees to implement the following specific plan:    Procedure:  Skin Biopsy.  After a thorough discussion of treatment options and risk/benefits/alternatives (including but not limited to local pain, scarring, dyspigmentation, blistering, possible superinfection, and inability to confirm a diagnosis via histopathology), verbal and written consent were obtained and portion of the rash was biopsied for tissue sample.  See below for consent that was obtained from patient and subsequent Procedure " "Note.    PROCEDURE TANGENTIAL (SHAVE) BIOPSY NOTE:    Performing Physician:  Lamar Kelsey PA-C  Anatomic Location; Clinical Description with size (cm); Pre-Op Diagnosis:   Right superior lateral shoulder; 0.5 x 0.6 cm irregular pink and brown macule ; r/o atypia  Post-op diagnosis: Same     Local anesthesia: 1% xylocaine with epi      Topical anesthesia: None    Hemostasis: Aluminum chloride       After obtaining informed consent  at which time there was a discussion about the purpose of biopsy  and low risks of infection and bleeding.  The area was prepped and draped in the usual fashion. Anesthesia was obtained with 1% lidocaine with epinephrine. A shave biopsy to an appropriate sampling depth was obtained by Shave (Dermablade or 15 blade) The resulting wound was covered with surgical ointment and bandaged appropriately.     The patient tolerated the procedure well without complications and was without signs of functional compromise.      Specimen has been sent for review by Dermatopathology.    Standard post-procedure care has been explained and has been included in written form within the patient's copy of Informed Consent.    INFORMED CONSENT DISCUSSION AND POST-OPERATIVE INSTRUCTIONS FOR PATIENT    I.  RATIONALE FOR PROCEDURE  I understand that a skin biopsy allows the Dermatologist to test a lesion or rash under the microscope to obtain a diagnosis.  It usually involves numbing the area with numbing medication and removing a small piece of skin; sometimes the area will be closed with sutures. In this specific procedure, sutures are not usually needed.  If any sutures are placed, then they are usually need to be removed in 2 weeks or less.    I understand that my Dermatologist recommends that a skin \"shave\" biopsy be performed today.  A local anesthetic, similar to the kind that a dentist uses when filling a cavity, will be injected with a very small needle into the skin area to be sampled.  The injected skin " "and tissue underneath \"will go to sleep” and become numb so no pain should be felt afterwards.  An instrument shaped like a tiny \"razor blade\" (shave biopsy instrument) will be used to cut a small piece of tissue and skin from the area so that a sample of tissue can be taken and examined more closely under the microscope.  A slight amount of bleeding will occur, but it will be stopped with direct pressure and a pressure bandage and any other appropriate methods.  I understands that a scar will form where the wound was created.  Surgical ointment will be applied to help protect the wound.  Sutures are not usually needed.    II.  RISKS AND POTENTIAL COMPLICATIONS   I understand the risks and potential complications of a skin biopsy include but are not limited to the following:  Bleeding  Infection  Pain  Scar/keloid  Skin discoloration  Incomplete Removal  Recurrence  Nerve Damage/Numbness/Loss of Function  Allergic Reaction to Anesthesia  Biopsies are diagnostic procedures and based on findings additional treatment or evaluation may be required  Loss or destruction of specimen resulting in no additional findings    My Dermatologist has explained to me the nature of the condition, the nature of the procedure, and the benefits to be reasonably expected compared with alternative approaches.  My Dermatologist has discussed the likelihood of major risks or complications of this procedure including the specific risks listed above, such as bleeding, infection, and scarring/keloid.  I understand that a scar is expected after this procedure.  I understand that my physician cannot predict if the scar will form a \"keloid,\" which extends beyond the borders of the wound that is created.  A keloid is a thick, painful, and bumpy scar.  A keloid can be difficult to treat, as it does not always respond well to therapy, which includes injecting cortisone directly into the keloid every few weeks.  While this usually reduces the pain " "and size of the scar, it does not eliminate it.      I understand that photographs may be taken before and after the procedure.  These will be maintained as part of the medical providers confidential records and may not be made available to me.  I further authorize the medical provider to use the photographs for teaching purposes or to illustrate scientific papers, books, or lectures if in his/her judgment, medical research, education, or science may benefit from its use.    I have had an opportunity to fully inquire about the risks and benefits of this procedure and its alternatives.   I have been given ample time and opportunity to ask questions and to seek a second opinion if I wished to do so.  I acknowledge that there have specifically been no guarantees as to the cosmetic results from the procedure.  I am aware that with any procedure there is always the possibility of an unexpected complication.    III. POST-PROCEDURAL CARE (WHAT YOU WILL NEED TO DO \"AFTER THE BIOPSY\" TO OPTIMIZE HEALING)    Keep the area clean and dry.  Try NOT to remove the bandage or get it wet for the first 24 hours.    Gently clean the area and apply surgical ointment (such as Vaseline petrolatum ointment, which is available \"over the counter\" and not a prescription) to the biopsy site for up to 2 weeks straight.  This acts to protect the wound from the outside world.      Sutures are not usually placed in this procedure.  If any sutures were placed, return for suture removal as instructed (generally 1 week for the face, 2 weeks for the body).      Take Acetaminophen (Tylenol) for discomfort, if no contraindications.  Ibuprofen or aspirin could make bleeding worse.    Call our office immediately for signs of infection: fever, chills, increased redness, warmth, tenderness, discomfort/pain, or pus or foul smell coming from the wound.    WHAT TO DO IF THERE IS ANY BLEEDING?  If a small amount of bleeding is noticed, place a clean cloth over " the area and apply firm pressure for ten minutes.  Check the wound after 10 minutes of direct pressure.  If bleeding persists, try one more time for an additional 10 minutes of direct pressure on the area.  If the bleeding becomes heavier or does not stop after the second attempt, or if you have any other questions about this procedure, then please call your [facility]'s Dermatologist by calling [phone] (SKIN).     I hereby acknowledge that I have reviewed and verified the site with my Dermatologist and have requested and authorized my Dermatologist to proceed with the procedure.    EPIDERMOID CYST    Physical Exam:  Anatomic Location Affected:  right postauricular  Morphological Description:  soft mobile subcutaneous nodule   Pertinent Positives:  Pertinent Negatives:    Additional History of Present Condition:  patient reports he noticed it a while ago. It does not get inflamed or drain.    Assessment and Plan:  Based on a thorough discussion of this condition and the management approach to it (including a comprehensive discussion of the known risks, side effects and potential benefits of treatment), the patient (family) agrees to implement the following specific plan:  Discussed excision, patient defers treatment at this time as it does not bother him.  Continue to monitor at 6mo skin exams    CHERRY ANGIOMAS     Physical Exam:  Anatomic Location Affected:  Trunk and extremities  Morphological Description:  Scattered cherry red papules  Denies pain, itch, bleeding. No treatments tried. Present for years. Present constantly; no modifying factors which make it worse or better.     Assessment and Plan:  Based on a thorough discussion of this condition and the management approach to it (including a comprehensive discussion of the known risks, side effects and potential benefits of treatment), the patient (family) agrees to implement the following specific plan:  Reassure benign      SEBORRHEIC KERATOSIS;  "NON-INFLAMED     Physical Exam:  Anatomic Location Affected:  Trunk and extremities  Morphological Description:  Waxy, smooth to warty textured, yellow to brownish-grey to dark brown to blackish, discrete, \"stuck-on\" appearing papules.  Present for years. Denies pain, itch, bleeding.      Additional History of Present Condition:  Present constantly; no modifying factors which make it worse or better. No prior treatment.       Assessment and Plan:  Based on a thorough discussion of this condition and the management approach to it (including a comprehensive discussion of the known risks, side effects and potential benefits of treatment), the patient (family) agrees to implement the following specific plan:  Reassure benign  Use sun protection.  Apply SPF 30 or higher at least three times a day.  Wear sun protecting clothing and hats.      SOLAR LENTIGINES   OTHER SKIN CHANGES DUE TO CHRONIC EXPOSURE TO NONIONIZING RADIATION     Physical Exam:  Anatomic Location Affected:  Sun exposed areas of back, chest, arms, legs  Morphological Description:  Multiple scattered brown to tan evenly pigmented macules   Denies pain, itch, bleeding. No treatments tried. Present for months - years. Reports getting newer lesions with sun exposure.       Assessment and Plan:  Based on a thorough discussion of this condition and the management approach to it (including a comprehensive discussion of the known risks, side effects and potential benefits of treatment), the patient (family) agrees to implement the following specific plan:  Reassure benign  Use sun protection.  Apply SPF 30 or higher at least three times a day.  Wear sun protecting clothing and hats.       MULTIPLE MELANOCYTIC NEVI (\"Moles\")     Physical Exam:  Anatomic Location Affected: Trunk and extremities  Morphological Description:  Scattered, round to ovoid, symmetrical-appearing, even bordered, skin colored to dark brown macules/papules  Denies pain, itch, bleeding. No " treatments tried. Present for years. Present constantly; no modifying factors which make it worse or better. Denies actively changing or growing moles.      Assessment and Plan:  Based on a thorough discussion of this condition and the management approach to it (including a comprehensive discussion of the known risks, side effects and potential benefits of treatment), the patient (family) agrees to implement the following specific plan:  Reassure benign  Monitor for changes  Use sun protection.  Apply SPF 30 or higher at least three times a day.  Wear sun protecting clothing and hats.     Worrisome signs of skin malignancy discussed, questions answered. Regular self-skin check discussed. Advised to call or return to office if patient notices any spots of concern, rapidly growing/changing lesions, bleeding lesions, non-healing lesions. Advised regular SPF use.     Scribe Attestation    I,:  Reena Bird MA am acting as a scribe while in the presence of the attending physician.:       I,:  Lamar Kelsey PA-C personally performed the services described in this documentation    as scribed in my presence.:

## 2025-02-27 ENCOUNTER — RESULTS FOLLOW-UP (OUTPATIENT)
Dept: DERMATOLOGY | Facility: CLINIC | Age: 60
End: 2025-02-27

## 2025-02-27 PROCEDURE — 88341 IMHCHEM/IMCYTCHM EA ADD ANTB: CPT | Performed by: STUDENT IN AN ORGANIZED HEALTH CARE EDUCATION/TRAINING PROGRAM

## 2025-02-27 PROCEDURE — 88305 TISSUE EXAM BY PATHOLOGIST: CPT | Performed by: STUDENT IN AN ORGANIZED HEALTH CARE EDUCATION/TRAINING PROGRAM

## 2025-02-27 PROCEDURE — 88342 IMHCHEM/IMCYTCHM 1ST ANTB: CPT | Performed by: STUDENT IN AN ORGANIZED HEALTH CARE EDUCATION/TRAINING PROGRAM

## 2025-02-27 NOTE — RESULT ENCOUNTER NOTE
DERMATOPATHOLOGY RESULT NOTE    Results reviewed by ordering provider and discussed with Dr. Villagomez  Called patient to personally discuss results. Discussed results with patient.       Instructions for Clinical Derm Team:   (remember to route Result Note to appropriate staff):    None-excision if patient willing    Result & Plan by Specimen:    Specimen A: benign  Plan: conservative excision advised -patient opting to monitor for now as he has a trip planned for next month and would like to reach back out to us after the trip to reconsider removal vs further observation for any recurrence    Tissue Exam: [id number]  Order: [id number]   Status: Final result      Dx: Neoplasm of uncertain behavior of skin    Test Result Released: Yes (seen)    View Follow-Up Encounter     Component  Ref Range & Units (hover)   Case Report  Surgical Pathology Report                         Case: [id number]                                  Authorizing Provider:  Lamar Kelsey PA-C          Collected:           02/21/2025 0917              Ordering Location:     [facility]      Received:            02/21/2025 0918                                     [place]                                                                Pathologist:           Krista Rossi MD                                                          Specimen:    Skin, Other, right superior lateral shoulder                                            Final Diagnosis  A. 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    Note: For the nevus, SOX10, MART-1, p16, and PRAME immunostains were reviewed; significant melanocytic architectural disorder is not seen, and the lesional cells retain expression of p16 and are negative for PRAME. For the fibrous histiocytoma, the lesional cells are negative for SOX10, CK AE1/AE3,  "desmin, and CD31 immunostains. Multiple levels examined. Conservative excision/removal of any residual/recurrent lesion is recommended to ensure against local persistence/recurrence.   Electronically signed by Krista Rossi MD on 2/27/2025 at 1021 EST  Additional Information   All reported additional testing was performed with appropriately reactive controls.  These tests were developed and their performance characteristics determined by [facility] or appropriate performing facility, though some tests may be performed on tissues which have not been validated for performance characteristics (such as staining performed on alcohol exposed cell blocks and decalcified tissues).  Results should be interpreted with caution and in the context of the patients' clinical condition. These tests may not be cleared or approved by the U.S. Food and Drug Administration, though the FDA has determined that such clearance or approval is not necessary. These tests are used for clinical purposes and they should not be regarded as investigational or for research. This laboratory has been approved by CLIA 88, designated as a high-complexity laboratory and is qualified to perform these tests.  .  Gross Description   A. The specimen is received in formalin, labeled with the patient's name and hospital number, and is designated \" right superior lateral shoulder\".  The specimen consists a shave biopsy of tan skin measuring 1.1 x 0.7 cm.  The epithelial surface is hairbearing and exhibits a brown pigmented macule measuring 0.8 x 0.7 cm.  The skin surface is inked red and the margin of resection is inked green.  The specimen is bisected.  Entirely submitted. One cassette.  Between sponges    Note: The estimated total formalin fixation time based upon information provided by the submitting clinician and the standard processing schedule is under 72 hours.      [place]  Clinical Information   ATTENTION:  DERMPATH " GROUP    SPECIMEN A; Skin; Anatomic Location: right superior lateral shoulder; Procedure/Protocol: Skin Specimen (submit in [facility]):Tangential Biopsy (includes shave, scoop, saucerization, curette) (CPT 30777; each additional tangential biopsy is CPT 83921)  59 y.o. year old  Male with a Morphological Description: 0.5 x 0.6 cm irregular pink and brown macule  Differential Diagnosis and/or Specific Clinical Question: r/o atypia  Resulting Agency [facility]          Specimen Collected: 02/21/25  9:17 AM Last Resulted: 02/27/25 10:21 AM     Order Details       View Encounter       Lab and Collection Details       Routing       Result History    Scans on Order [id number]    Lab Result Document - Document on 2/27/2025 10:21 AM

## 2025-04-08 ENCOUNTER — TELEPHONE (OUTPATIENT)
Dept: DERMATOLOGY | Facility: CLINIC | Age: 60
End: 2025-04-08

## 2025-04-08 DIAGNOSIS — B36.0 TINEA VERSICOLOR: ICD-10-CM

## 2025-04-08 NOTE — TELEPHONE ENCOUNTER
Patient called requesting refill for      selenium sulfide (SELSUN) 2.5 % shampoo                   . Patient made aware medication was refilled on 8.8.2024 for 118 with 3   refills to  [id number] pharmacy. Patient instructed to contact the pharmacy to obtain refills of medication. Patient verbalized understanding.

## 2025-04-09 RX ORDER — SELENIUM SULFIDE 2.5 MG/100ML
LOTION TOPICAL
Qty: 118 ML | Refills: 3 | Status: SHIPPED | OUTPATIENT
Start: 2025-04-09

## 2025-04-09 NOTE — TELEPHONE ENCOUNTER
Pt was seen 02/21/25      Can use Selenium sulfide Shampoo 2.5%- Daily for 2 weeks straight and then twice a week; Lather into scalp and skin on face, neck, chest, and back; leave on for 5 minutes and then rinse off completely.

## 2025-06-13 ENCOUNTER — RA CDI HCC (OUTPATIENT)
Dept: OTHER | Facility: HOSPITAL | Age: 60
End: 2025-06-13